# Patient Record
Sex: MALE | Race: WHITE | Employment: OTHER | URBAN - NONMETROPOLITAN AREA
[De-identification: names, ages, dates, MRNs, and addresses within clinical notes are randomized per-mention and may not be internally consistent; named-entity substitution may affect disease eponyms.]

---

## 2017-08-22 RX ORDER — FENOFIBRATE 145 MG/1
145 TABLET, COATED ORAL DAILY
COMMUNITY
End: 2018-12-19

## 2017-08-22 RX ORDER — ATORVASTATIN CALCIUM 40 MG/1
40 TABLET, FILM COATED ORAL DAILY
COMMUNITY
End: 2019-08-13

## 2017-08-22 RX ORDER — FUROSEMIDE 40 MG/1
40 TABLET ORAL 2 TIMES DAILY
COMMUNITY
End: 2017-10-03 | Stop reason: ALTCHOICE

## 2017-08-22 RX ORDER — CARVEDILOL 25 MG/1
25 TABLET ORAL 2 TIMES DAILY WITH MEALS
COMMUNITY
End: 2019-03-25 | Stop reason: SDUPTHER

## 2017-08-22 RX ORDER — LISINOPRIL 5 MG/1
5 TABLET ORAL DAILY
COMMUNITY
End: 2019-07-22 | Stop reason: SDUPTHER

## 2017-08-22 RX ORDER — SPIRONOLACTONE 25 MG/1
25 TABLET ORAL DAILY
COMMUNITY
End: 2019-08-13

## 2017-10-03 ENCOUNTER — TELEPHONE (OUTPATIENT)
Dept: CARDIOLOGY | Age: 67
End: 2017-10-03

## 2017-10-03 ENCOUNTER — OFFICE VISIT (OUTPATIENT)
Dept: CARDIOLOGY | Age: 67
End: 2017-10-03
Payer: MEDICARE

## 2017-10-03 VITALS — BODY MASS INDEX: 31.23 KG/M2 | HEIGHT: 67 IN | WEIGHT: 199 LBS

## 2017-10-03 DIAGNOSIS — I25.10 CORONARY ARTERY DISEASE INVOLVING NATIVE CORONARY ARTERY OF NATIVE HEART WITHOUT ANGINA PECTORIS: ICD-10-CM

## 2017-10-03 DIAGNOSIS — I50.22 CHRONIC SYSTOLIC CONGESTIVE HEART FAILURE (HCC): Primary | ICD-10-CM

## 2017-10-03 DIAGNOSIS — Z95.810 AICD (AUTOMATIC CARDIOVERTER/DEFIBRILLATOR) PRESENT: ICD-10-CM

## 2017-10-03 DIAGNOSIS — I42.8 NONISCHEMIC CARDIOMYOPATHY (HCC): ICD-10-CM

## 2017-10-03 DIAGNOSIS — Z79.4 TYPE 2 DIABETES MELLITUS WITH COMPLICATION, WITH LONG-TERM CURRENT USE OF INSULIN (HCC): ICD-10-CM

## 2017-10-03 DIAGNOSIS — E78.2 MIXED HYPERLIPIDEMIA: ICD-10-CM

## 2017-10-03 DIAGNOSIS — E11.8 TYPE 2 DIABETES MELLITUS WITH COMPLICATION, WITH LONG-TERM CURRENT USE OF INSULIN (HCC): ICD-10-CM

## 2017-10-03 PROCEDURE — 4040F PNEUMOC VAC/ADMIN/RCVD: CPT | Performed by: CLINICAL NURSE SPECIALIST

## 2017-10-03 PROCEDURE — 3017F COLORECTAL CA SCREEN DOC REV: CPT | Performed by: CLINICAL NURSE SPECIALIST

## 2017-10-03 PROCEDURE — 4004F PT TOBACCO SCREEN RCVD TLK: CPT | Performed by: CLINICAL NURSE SPECIALIST

## 2017-10-03 PROCEDURE — G8427 DOCREV CUR MEDS BY ELIG CLIN: HCPCS | Performed by: CLINICAL NURSE SPECIALIST

## 2017-10-03 PROCEDURE — G8484 FLU IMMUNIZE NO ADMIN: HCPCS | Performed by: CLINICAL NURSE SPECIALIST

## 2017-10-03 PROCEDURE — 99204 OFFICE O/P NEW MOD 45 MIN: CPT | Performed by: CLINICAL NURSE SPECIALIST

## 2017-10-03 PROCEDURE — 1123F ACP DISCUSS/DSCN MKR DOCD: CPT | Performed by: CLINICAL NURSE SPECIALIST

## 2017-10-03 PROCEDURE — 3046F HEMOGLOBIN A1C LEVEL >9.0%: CPT | Performed by: CLINICAL NURSE SPECIALIST

## 2017-10-03 PROCEDURE — G8598 ASA/ANTIPLAT THER USED: HCPCS | Performed by: CLINICAL NURSE SPECIALIST

## 2017-10-03 PROCEDURE — G8417 CALC BMI ABV UP PARAM F/U: HCPCS | Performed by: CLINICAL NURSE SPECIALIST

## 2017-10-03 RX ORDER — ASPIRIN 325 MG
TABLET ORAL
COMMUNITY
End: 2022-03-28

## 2017-10-03 NOTE — PROGRESS NOTES
[facility], [address], [address]  [address]  Phone: [phone]  Fax: [phone]    OFFICE VISIT:  10/3/2017    [facility] [address]: 1950    Reason For Visit:  Ruben Fernandez is a 74 y.o. male who is here for to establish care with a history of systolic congestive heart failure and an AICD    HPI   Patient is referred here by his PCP who was concerned that he had had no follow-up for his systolic congestive heart failure and his subcutaneous ICD. Patient had his heart cath initially here at [facility] in 2014 and then was transferred to [facility] for care. He saw Dr. aPm Maldonado there. He states his physician at [facility] left and he was supposed to be rescheduled with another provider, but he states he never received a phone call and just never followed up. His device has not been checked he states since 2015 and his PCP was concerned. Patient had a nonischemic cardiomyopathy. His wife reports they were told his ejection fraction had improved and was initially 10-15% and 2014. She is denying any signs of fluid overload such as sudden weight gain, worsening dyspnea, orthopnea, PND, edema. He denies palpitations or chest pain. He reports he is quite active at home and is able to walk a mile before he feels short of breath. He does a lot of hunting and fishing and walking across fields and tolerates this well. He reports he has continued his medications regularly    No primary care provider on file. is PCP.   Ilda Fang has the following history as recorded in [facility]:    Patient Active Problem List    Diagnosis Date Noted    Coronary artery disease involving native coronary artery of native heart without angina pectoris 10/04/2017    Nonischemic cardiomyopathy ([address] 10/04/2017    Mixed hyperlipidemia 10/04/2017    Type 2 diabetes mellitus with complication, with long-term current use of insulin ([address] 10/04/2017    AICD (automatic cardioverter/defibrillator) present 10/04/2017     Past Medical History:   Diagnosis Date    AICD (automatic cardioverter/defibrillator) present 10/4/2017    Cardiac defibrillator in place     Coronary artery disease involving native coronary artery of native heart without angina pectoris 10/4/2017    Hypertension     Mixed hyperlipidemia 10/4/2017    [facility]     Nonischemic cardiomyopathy ([facility] 10/4/2017    Type 2 diabetes mellitus with complication, with long-term current use of insulin ([facility] 10/4/2017     Past Surgical History:   Procedure Laterality Date    CARDIAC CATHETERIZATION  6/9/14  JDT    diffuse CAD. EF 5%    CARDIAC DEFIBRILLATOR PLACEMENT  2014    Subcutaneous AICD, [place]    NECK SURGERY      SHOULDER SURGERY       History reviewed. No pertinent family history. Social History   Substance Use Topics    Smoking status: Not on file    Smokeless tobacco: Not on file    Alcohol use Not on file      Current Outpatient Prescriptions   Medication Sig Dispense Refill    aspirin 325 MG tablet Take 325 mg by mouth daily 1/2 tablet daily      carvedilol (COREG) 25 MG tablet Take 25 mg by mouth 2 times daily (with meals)      lisinopril (PRINIVIL;ZESTRIL) 5 MG tablet Take 5 mg by mouth daily      atorvastatin (LIPITOR) 40 MG tablet Take 40 mg by mouth daily      spironolactone (ALDACTONE) 25 MG tablet Take 25 mg by mouth daily      Dulaglutide (TRULICITY) 9.22 FH/9.8UV SOPN Inject into the skin      fenofibrate (TRICOR) 145 MG tablet Take 145 mg by mouth daily       No current facility-administered medications for this visit. Allergies: Nsaids    Review of Systems  Review of Systems   Constitutional: Positive for malaise/fatigue (mild). Negative for chills and fever. Eyes: Negative for blurred vision. Respiratory: Positive for shortness of breath (can walk a mile before feeling short of breath). Negative for cough. Cardiovascular: Positive for leg swelling (mild).  Negative for chest pain, palpitations, orthopnea and PND. Gastrointestinal: Negative for heartburn, nausea and vomiting. Musculoskeletal: Negative for falls and myalgias. Skin: Negative for rash. Neurological: Negative for dizziness, sensory change, speech change, focal weakness and headaches. Endo/Heme/Allergies: Does not bruise/bleed easily. Psychiatric/Behavioral: Negative for depression. The patient is not nervous/anxious. Objective  Vital Signs - Ht 5' 7\" (1.702 m)  Wt 199 lb (90.3 kg)  BMI 31.17 kg/m2  Physical Exam   Constitutional: He is oriented to person, place, and time. He appears well-developed and well-nourished. No distress. HENT:   Head: Normocephalic and atraumatic. Eyes: Pupils are equal, round, and reactive to light. Right eye exhibits no discharge. Left eye exhibits no discharge. Neck: No JVD present. No tracheal deviation present. Cardiovascular: Normal rate, regular rhythm, normal heart sounds and intact distal pulses. Exam reveals no gallop and no friction rub. No murmur heard. No carotid bruit   Pulmonary/Chest: Effort normal and breath sounds normal. No respiratory distress. He has no wheezes. He has no rales. Abdominal: Soft. There is no tenderness. Musculoskeletal: He exhibits edema (trace lower extremity edema). Normal gait and station   Neurological: He is alert and oriented to person, place, and time. No cranial nerve deficit. Skin: Skin is warm and dry. No rash noted. Psychiatric: He has a normal mood and affect. His behavior is normal. Judgment normal.   Nursing note and vitals reviewed. Assessment:    1. Chronic systolic congestive heart failure (HCC)  ECHO Complete 2D W Doppler W Color   2. Nonischemic cardiomyopathy ([address]     3. Coronary artery disease involving native coronary artery of native heart without angina pectoris     4. Mixed hyperlipidemia     5. Type 2 diabetes mellitus with complication, with long-term current use of insulin ([address]     6. AICD (automatic cardioverter/defibrillator) present       Patient is taking medications as prescribed    Heart cath from 2014 which showed an ejection fraction of 5%, moderate, diffuse CAD    We are unable to interrogate his device as a special machine is required for his subcutaneous AICD. It is a iDevices device. We will get the machine here for his next visit and interrogate at that time. Patient is denying any ischemic symptoms. Plan will be to get a baseline 2-D echocardiogram to assess his ejection fraction as it is been several years since this has been done. We also need records from [facility] and will have patient sign a release to obtain. He appears euvolemic today and is able to be quite active at home without symptoms. He is on guideline directed medical therapy for heart failure    NYHA Class II, Stage C    Stable cardiovascular status. No evidence of overt heart failure, angina or dysrhythmia. Plan    Orders Placed This Encounter   Procedures    ECHO Complete 2D W Doppler W Color     Standing Status:   Future     Standing Expiration Date:   10/3/2018     Order Specific Question:   Reason for exam:     Answer:   systolic chf     Followup With Dr. Bal Maldonado 4 weeks  Obtain Records- Dr. Duenas Ranks at [address] daily and report weight gain of 3lbs or more in 24hrs or 5lbs in one week. Call for increasing shortness of breath or increasing swelling in feet and legs. (This could mean you are retaining too much fluid)  - 2000mg low sodium diet  - Fluid restriction of 1500ml per day (about 6 cups of fluid per day)    Call with any questions or concerns  Follow up with No primary care provider on file. for non cardiac problems  Report any new problems  Cardiovascular Fitness-Exercise as tolerated. Strive for 15 minutes of exercise most days of the week.     Cardiac / Healthy Diet  Continue current medications as directed  Continue plan of treatment  It is always recommended that you bring your medications bottles with you to each visit - this is for your safety!        SMILEY Fernandez

## 2017-10-03 NOTE — MR AVS SNAPSHOT
After Visit Summary             Quyen Kumar   10/3/2017 2:00 PM   Office Visit    Description:  Male : 1950   Provider:  SMILEY De Jesus   Department:  [facility]              Your Follow-Up and Future Appointments         Below is a list of your follow-up and future appointments. This may not be a complete list as you may have made appointments directly with providers that we are not aware of or your providers may have made some for you. Please call your providers to confirm appointments. It is important to keep your appointments. Please bring your current insurance card, photo ID, co-pay, and all medication bottles to your appointment. If self-pay, payment is expected at the time of service. Your To-Do List     Future Appointments Provider Department Dept Phone    10/31/2017 2:45 PM Roderick Rucker MD [id number] [phone]    Please arrive 15 minutes prior to appointment time, bring insurance card and photo ID. Future Orders Complete By Expires    ECHO Complete 2D W Doppler W Color [34993 Custom]  10/10/2017 10/3/2018         Information from Your Visit        Department     Name Address Phone Fax    [facility] [address]      You Were Seen for:         Comments    Chronic systolic congestive heart failure (HCC)   [151099]         Vital Signs     Height Weight Body Mass Index             5' 7\" (1.702 m) 199 lb (90.3 kg) 31.17 kg/m2         Additional Information about your Body Mass Index (BMI)           Your BMI as listed above is considered obese (30 or more). BMI is an estimate of body fat, calculated from your height and weight.   The higher your BMI, the greater your risk of heart disease, high blood pressure, type 2 diabetes, stroke, gallstones, arthritis, sleep apnea, and certain lower your chance of needing to be hospitalized again. ¨ Angiotensin II receptor blockers (ARBs) work like ACEIs. Your doctor may prescribe them instead of ACEIs. ¨ Diuretics, also called water pills, reduce swelling. ¨ Potassium supplements replace this important mineral, which is sometimes lost with diuretics. ¨ Aspirin and other blood thinners prevent blood clots, which can cause a stroke or heart attack. You will get more details on the specific medicines your doctor prescribes. Diet  · Your doctor may suggest that you limit sodium to 2,000 milligrams (mg) a day or less. That is less than 1 teaspoon of salt a day, including all the salt you eat in cooking or in packaged foods. People get most of their sodium from processed foods. Fast food and restaurant meals also tend to be very high in sodium. · Ask your doctor how much liquid you can drink each day. You may have to limit liquids. Weight  · Weigh yourself without clothing at the same time each day. Record your weight. Call your doctor if you have a sudden weight gain, such as more than 2 to 3 pounds in a day or 5 pounds in a week. (Your doctor may suggest a different range of weight gain.) A sudden weight gain may mean that your heart failure is getting worse. Activity level  · Start light exercise (if your doctor says it is okay). Even if you can only do a small amount, exercise will help you get stronger, have more energy, and manage your weight and your stress. Walking is an easy way to get exercise. Start out by walking a little more than you did before. Bit by bit, increase the amount you walk. · When you exercise, watch for signs that your heart is working too hard. You are pushing yourself too hard if you cannot talk while you are exercising.  If you become short of breath or dizzy or have chest pain, stop, sit down, and rest.  · If you feel \"wiped out\" the day after you exercise, walk slower or for a [employer] allows you to send messages to your doctor, view your test results, renew your prescriptions, schedule appointments, view visit notes, and more. How Do I Sign Up? 1. In your Internet browser, go to [email or web address]  2. Click on the Sign Up Now link in the Sign In box. You will see the New Member Sign Up page. 3. Enter your [employer] Access Code exactly as it appears below. You will not need to use this code after youve completed the sign-up process. If you do not sign up before the expiration date, you must request a new code. [employer] Access Code: [email or web address]  Expires: 12/2/2017  3:42 PM    4. Enter your Social Security Number ([id number]) and Date of Birth (mm/dd/yyyy) as indicated and click Submit. You will be taken to the next sign-up page. 5. Create a [employer] ID. This will be your [employer] login ID and cannot be changed, so think of one that is secure and easy to remember. 6. Create a [employer] password. You can change your password at any time. 7. Enter your Password Reset Question and Answer. This can be used at a later time if you forget your password. 8. Enter your e-mail address. You will receive e-mail notification when new information is available in [address] 9. Click Sign Up. You can now view your medical record. Additional Information  If you have questions, please contact the physician practice where you receive care. Remember, [employer] is NOT to be used for urgent needs. For medical emergencies, dial 911. For questions regarding your [employer] account call [phone]. If you have a clinical question, please call your doctor's office.

## 2017-10-03 NOTE — PATIENT INSTRUCTIONS
Followup With Dr. Lennox Doffing 4 weeks  Dr. Ean Garcia at [facility] daily and report weight gain of 3lbs or more in 24hrs or 5lbs in one week. Call for increasing shortness of breath or increasing swelling in feet and legs. (This could mean you are retaining too much fluid)  - 2000mg low sodium diet  - Fluid restriction of 1500ml per day (about 6 cups of fluid per day)    Call with any questions or concerns  Follow up with No primary care provider on file. for non cardiac problems  Report any new problems  Cardiovascular Fitness-Exercise as tolerated. Strive for 15 minutes of exercise most days of the week. Cardiac / Healthy Diet  Continue current medications as directed  Continue plan of treatment  It is always recommended that you bring your medications bottles with you to each visit - this is for your safety! You are scheduled for your echo at [facility] @ 12:30 pm please arrive about 15 minutes early to register. Transthoracic Echocardiogram -  No prep. Takes approximately 30 min. An echocardiogram uses sound waves to produce images of your heart. This commonly used test allows your doctor to see how your heart is beating and pumping blood. Your doctor can use the images from an echocardiogram to identify various abnormalities in the heart muscle and valves. This test has 2 parts:   Ø You will be asked to disrobe from the waist up and given a gown to wear. The technologist will then hook up an EKG monitor to you for the entire exam.   Ø You will then have an ultrasound of your heart (echocardiogram) to assess the heart muscle, heart valves and heart function. You may eat and take any medicines before the exam.     If you need to change your appointment, please call outpatient scheduling at [phone]. Heart Failure: Care Instructions  Your Care Instructions    Heart failure occurs when your heart does not pump as much blood as the body needs.  Failure does not medicines your doctor prescribes. Diet  · Your doctor may suggest that you limit sodium to 2,000 milligrams (mg) a day or less. That is less than 1 teaspoon of salt a day, including all the salt you eat in cooking or in packaged foods. People get most of their sodium from processed foods. Fast food and restaurant meals also tend to be very high in sodium. · Ask your doctor how much liquid you can drink each day. You may have to limit liquids. Weight  · Weigh yourself without clothing at the same time each day. Record your weight. Call your doctor if you have a sudden weight gain, such as more than 2 to 3 pounds in a day or 5 pounds in a week. (Your doctor may suggest a different range of weight gain.) A sudden weight gain may mean that your heart failure is getting worse. Activity level  · Start light exercise (if your doctor says it is okay). Even if you can only do a small amount, exercise will help you get stronger, have more energy, and manage your weight and your stress. Walking is an easy way to get exercise. Start out by walking a little more than you did before. Bit by bit, increase the amount you walk. · When you exercise, watch for signs that your heart is working too hard. You are pushing yourself too hard if you cannot talk while you are exercising. If you become short of breath or dizzy or have chest pain, stop, sit down, and rest.  · If you feel \"wiped out\" the day after you exercise, walk slower or for a shorter distance until you can work up to a better pace. · Get enough rest at night. Sleeping with 1 or 2 pillows under your upper body and head may help you breathe easier. Lifestyle changes  · Do not smoke. Smoking can make a heart condition worse. If you need help quitting, talk to your doctor about stop-smoking programs and medicines. These can increase your chances of quitting for good. Quitting smoking may be the most important step you can take to protect your heart.   · Limit alcohol to

## 2017-10-04 PROBLEM — E11.8 TYPE 2 DIABETES MELLITUS WITH COMPLICATION, WITH LONG-TERM CURRENT USE OF INSULIN (HCC): Status: ACTIVE | Noted: 2017-10-04

## 2017-10-04 PROBLEM — I25.10 CORONARY ARTERY DISEASE INVOLVING NATIVE CORONARY ARTERY OF NATIVE HEART WITHOUT ANGINA PECTORIS: Status: ACTIVE | Noted: 2017-10-04

## 2017-10-04 PROBLEM — Z79.4 TYPE 2 DIABETES MELLITUS WITH COMPLICATION, WITH LONG-TERM CURRENT USE OF INSULIN (HCC): Status: ACTIVE | Noted: 2017-10-04

## 2017-10-04 PROBLEM — I42.8 NONISCHEMIC CARDIOMYOPATHY (HCC): Status: ACTIVE | Noted: 2017-10-04

## 2017-10-04 PROBLEM — Z95.810 AICD (AUTOMATIC CARDIOVERTER/DEFIBRILLATOR) PRESENT: Status: ACTIVE | Noted: 2017-10-04

## 2017-10-04 PROBLEM — E78.2 MIXED HYPERLIPIDEMIA: Status: ACTIVE | Noted: 2017-10-04

## 2017-10-04 ASSESSMENT — ENCOUNTER SYMPTOMS
NAUSEA: 0
BLURRED VISION: 0
COUGH: 0
VOMITING: 0
HEARTBURN: 0
ORTHOPNEA: 0
SHORTNESS OF BREATH: 1

## 2017-10-30 ENCOUNTER — TELEPHONE (OUTPATIENT)
Dept: CARDIOLOGY | Age: 67
End: 2017-10-30

## 2017-10-31 ENCOUNTER — OFFICE VISIT (OUTPATIENT)
Dept: CARDIOLOGY | Age: 67
End: 2017-10-31
Payer: MEDICARE

## 2017-10-31 VITALS
BODY MASS INDEX: 29.82 KG/M2 | HEART RATE: 62 BPM | HEIGHT: 67 IN | DIASTOLIC BLOOD PRESSURE: 68 MMHG | WEIGHT: 190 LBS | SYSTOLIC BLOOD PRESSURE: 128 MMHG

## 2017-10-31 DIAGNOSIS — Z95.810 AICD (AUTOMATIC CARDIOVERTER/DEFIBRILLATOR) PRESENT: ICD-10-CM

## 2017-10-31 DIAGNOSIS — I25.10 CORONARY ARTERY DISEASE INVOLVING NATIVE CORONARY ARTERY OF NATIVE HEART WITHOUT ANGINA PECTORIS: ICD-10-CM

## 2017-10-31 DIAGNOSIS — E78.2 MIXED HYPERLIPIDEMIA: ICD-10-CM

## 2017-10-31 DIAGNOSIS — I42.8 NONISCHEMIC CARDIOMYOPATHY (HCC): ICD-10-CM

## 2017-10-31 DIAGNOSIS — I50.22 CHRONIC SYSTOLIC CONGESTIVE HEART FAILURE (HCC): Primary | ICD-10-CM

## 2017-10-31 PROCEDURE — 1123F ACP DISCUSS/DSCN MKR DOCD: CPT | Performed by: INTERNAL MEDICINE

## 2017-10-31 PROCEDURE — G8417 CALC BMI ABV UP PARAM F/U: HCPCS | Performed by: INTERNAL MEDICINE

## 2017-10-31 PROCEDURE — G8598 ASA/ANTIPLAT THER USED: HCPCS | Performed by: INTERNAL MEDICINE

## 2017-10-31 PROCEDURE — 4040F PNEUMOC VAC/ADMIN/RCVD: CPT | Performed by: INTERNAL MEDICINE

## 2017-10-31 PROCEDURE — G8427 DOCREV CUR MEDS BY ELIG CLIN: HCPCS | Performed by: INTERNAL MEDICINE

## 2017-10-31 PROCEDURE — 3017F COLORECTAL CA SCREEN DOC REV: CPT | Performed by: INTERNAL MEDICINE

## 2017-10-31 PROCEDURE — 93261 INTERROGATE SUBQ DEFIB: CPT | Performed by: INTERNAL MEDICINE

## 2017-10-31 PROCEDURE — G8484 FLU IMMUNIZE NO ADMIN: HCPCS | Performed by: INTERNAL MEDICINE

## 2017-10-31 PROCEDURE — 4004F PT TOBACCO SCREEN RCVD TLK: CPT | Performed by: INTERNAL MEDICINE

## 2017-10-31 PROCEDURE — 99213 OFFICE O/P EST LOW 20 MIN: CPT | Performed by: INTERNAL MEDICINE

## 2017-10-31 NOTE — PROGRESS NOTES
[employer] rep here with  for ICD. SubQ ICD interrogated. No new episodes. Update on Subq ICD completed. Battery at 55%.

## 2017-10-31 NOTE — PROGRESS NOTES
[facility] Patient Office Visit    [place] [address]  Phone: [phone]  Fax: [phone]        10/31/2017    Chief Complaint / Reason for the Visit   Follow up of:  CAD  and Cardiomyopathy and CHF      Specialty Problems        Cardiology Problems    Coronary artery disease involving native coronary artery of native heart without angina pectoris        Nonischemic cardiomyopathy [facility])              Current Status Today According to the patient:  \"I'm doing okay\"    Subjective:  Mr. Rosangela Brian is generally feeling stable. Mr. Rosangela Brian has the following cardiac complaints / symptoms today:    1. CAD, Is stable on current medications    2. Cardiomyopathy, Is stable on current medications    3.  CHF, Is stable on current medications        Rosangela Brian is a 74 y.o. male with the following history as recorded in [employer]:    Patient Active Problem List    Diagnosis Date Noted    Coronary artery disease involving native coronary artery of native heart without angina pectoris 10/04/2017    Nonischemic [facility] ([facility] 10/04/2017    Mixed hyperlipidemia 10/04/2017    Type 2 diabetes mellitus with complication, with long-term current use of insulin ([facility] 10/04/2017    AICD (automatic cardioverter/defibrillator) present 10/04/2017     Current Outpatient Prescriptions   Medication Sig Dispense Refill    aspirin 325 MG tablet Take 325 mg by mouth daily 1/2 tablet daily      carvedilol (COREG) 25 MG tablet Take 25 mg by mouth 2 times daily (with meals)      lisinopril (PRINIVIL;ZESTRIL) 5 MG tablet Take 5 mg by mouth daily      atorvastatin (LIPITOR) 40 MG tablet Take 40 mg by mouth daily      spironolactone (ALDACTONE) 25 MG tablet Take 25 mg by mouth daily      Dulaglutide (TRULICITY) 5.64 TS/8.4JH SOPN Inject into the skin      fenofibrate (TRICOR) 145 MG tablet Take 145 mg by mouth daily       No current facility-administered medications for this visit. Allergies: Nsaids  Past Medical History:   Diagnosis Date    AICD (automatic cardioverter/defibrillator) present 10/4/2017    Cardiac defibrillator in place     Coronary artery disease involving native coronary artery of native heart without angina pectoris 10/4/2017    Hypertension     Mixed hyperlipidemia 10/4/2017    Myocardial infarction     Nonischemic cardiomyopathy ([facility] 10/4/2017    Type 2 diabetes mellitus with complication, with long-term current use of insulin ([facility] 10/4/2017     Past Surgical History:   Procedure Laterality Date    CARDIAC CATHETERIZATION  6/9/14  JDT    diffuse CAD. EF 5%    CARDIAC DEFIBRILLATOR PLACEMENT  2014    Subcutaneous AICD, [place]    NECK SURGERY      SHOULDER SURGERY       History reviewed. No pertinent family history. Social History   Substance Use Topics    Smoking status: Not on file    Smokeless tobacco: Not on file    Alcohol use Not on file          Review of Systems:    General:      Complaint / Symptom Yes / No / Description if Yes       Fatigue No   Weight gain No   Insomnia No       Respiratory:        Complaint / Symptom Yes / No / Description if Yes       Cough No   Horseness No       Cardiovascular:    Complaint / Symptom Yes / No / Description if Yes       Chest Pain No   Shortness of Air / Orthopnea No   Presyncope / Syncope No   Palpitations No         Objective:    /68   Pulse 62   Ht 5' 7\" (1.702 m)   Wt 190 lb (86.2 kg)   BMI 29.76 kg/m²     GENERAL - well developed and well nourished, conversant  HEENT -  PERRLA, Hearing appears normal  NECK - no thyromegaly, no JVD, trachea is in the midline  CARDIOVASCULAR - PMI is in the mid line clavicular position, Normal S1 and S2 with a grade 1/6 systolic murmur. No S3 or S4    PULMONARY - no respiratory distress. No wheezes or rales.  Lungs are clear to ausculation   ABDOMEN  - soft, non tender, no rebound  MUSCULOSKELETAL  -

## 2018-02-23 ENCOUNTER — TELEPHONE (OUTPATIENT)
Dept: CARDIOLOGY | Age: 68
End: 2018-02-23

## 2018-02-27 ENCOUNTER — OFFICE VISIT (OUTPATIENT)
Dept: CARDIOLOGY | Age: 68
End: 2018-02-27
Payer: MEDICARE

## 2018-02-27 VITALS
BODY MASS INDEX: 30.61 KG/M2 | DIASTOLIC BLOOD PRESSURE: 58 MMHG | SYSTOLIC BLOOD PRESSURE: 120 MMHG | HEART RATE: 62 BPM | HEIGHT: 67 IN | WEIGHT: 195 LBS

## 2018-02-27 DIAGNOSIS — E78.2 MIXED HYPERLIPIDEMIA: ICD-10-CM

## 2018-02-27 DIAGNOSIS — Z95.810 AICD (AUTOMATIC CARDIOVERTER/DEFIBRILLATOR) PRESENT: ICD-10-CM

## 2018-02-27 DIAGNOSIS — I50.22 CHRONIC SYSTOLIC CONGESTIVE HEART FAILURE (HCC): Primary | ICD-10-CM

## 2018-02-27 DIAGNOSIS — I42.8 NONISCHEMIC CARDIOMYOPATHY (HCC): ICD-10-CM

## 2018-02-27 DIAGNOSIS — I25.10 CORONARY ARTERY DISEASE INVOLVING NATIVE CORONARY ARTERY OF NATIVE HEART WITHOUT ANGINA PECTORIS: ICD-10-CM

## 2018-02-27 PROCEDURE — 93261 INTERROGATE SUBQ DEFIB: CPT | Performed by: INTERNAL MEDICINE

## 2018-02-27 PROCEDURE — 3017F COLORECTAL CA SCREEN DOC REV: CPT | Performed by: INTERNAL MEDICINE

## 2018-02-27 PROCEDURE — 4040F PNEUMOC VAC/ADMIN/RCVD: CPT | Performed by: INTERNAL MEDICINE

## 2018-02-27 PROCEDURE — 99212 OFFICE O/P EST SF 10 MIN: CPT | Performed by: INTERNAL MEDICINE

## 2018-02-27 PROCEDURE — G8417 CALC BMI ABV UP PARAM F/U: HCPCS | Performed by: INTERNAL MEDICINE

## 2018-02-27 PROCEDURE — 4004F PT TOBACCO SCREEN RCVD TLK: CPT | Performed by: INTERNAL MEDICINE

## 2018-02-27 PROCEDURE — G8484 FLU IMMUNIZE NO ADMIN: HCPCS | Performed by: INTERNAL MEDICINE

## 2018-02-27 PROCEDURE — G8427 DOCREV CUR MEDS BY ELIG CLIN: HCPCS | Performed by: INTERNAL MEDICINE

## 2018-02-27 PROCEDURE — G8598 ASA/ANTIPLAT THER USED: HCPCS | Performed by: INTERNAL MEDICINE

## 2018-02-27 PROCEDURE — 1123F ACP DISCUSS/DSCN MKR DOCD: CPT | Performed by: INTERNAL MEDICINE

## 2018-05-08 ENCOUNTER — TELEPHONE (OUTPATIENT)
Dept: CARDIOLOGY | Age: 68
End: 2018-05-08

## 2018-06-01 ENCOUNTER — OFFICE VISIT (OUTPATIENT)
Dept: CARDIOLOGY | Age: 68
End: 2018-06-01
Payer: MEDICARE

## 2018-06-01 VITALS
DIASTOLIC BLOOD PRESSURE: 74 MMHG | WEIGHT: 194 LBS | HEART RATE: 70 BPM | HEIGHT: 67 IN | SYSTOLIC BLOOD PRESSURE: 126 MMHG | BODY MASS INDEX: 30.45 KG/M2

## 2018-06-01 DIAGNOSIS — I42.8 NONISCHEMIC CARDIOMYOPATHY (HCC): ICD-10-CM

## 2018-06-01 DIAGNOSIS — Z95.810 AICD (AUTOMATIC CARDIOVERTER/DEFIBRILLATOR) PRESENT: ICD-10-CM

## 2018-06-01 DIAGNOSIS — I50.22 CHRONIC SYSTOLIC CONGESTIVE HEART FAILURE (HCC): Primary | ICD-10-CM

## 2018-06-01 PROCEDURE — 99213 OFFICE O/P EST LOW 20 MIN: CPT | Performed by: CLINICAL NURSE SPECIALIST

## 2018-06-01 PROCEDURE — G8417 CALC BMI ABV UP PARAM F/U: HCPCS | Performed by: CLINICAL NURSE SPECIALIST

## 2018-06-01 PROCEDURE — 4040F PNEUMOC VAC/ADMIN/RCVD: CPT | Performed by: CLINICAL NURSE SPECIALIST

## 2018-06-01 PROCEDURE — G8598 ASA/ANTIPLAT THER USED: HCPCS | Performed by: CLINICAL NURSE SPECIALIST

## 2018-06-01 PROCEDURE — 1123F ACP DISCUSS/DSCN MKR DOCD: CPT | Performed by: CLINICAL NURSE SPECIALIST

## 2018-06-01 PROCEDURE — 1036F TOBACCO NON-USER: CPT | Performed by: CLINICAL NURSE SPECIALIST

## 2018-06-01 PROCEDURE — 93282 PRGRMG EVAL IMPLANTABLE DFB: CPT | Performed by: CLINICAL NURSE SPECIALIST

## 2018-06-01 PROCEDURE — 3017F COLORECTAL CA SCREEN DOC REV: CPT | Performed by: CLINICAL NURSE SPECIALIST

## 2018-06-01 PROCEDURE — G8427 DOCREV CUR MEDS BY ELIG CLIN: HCPCS | Performed by: CLINICAL NURSE SPECIALIST

## 2018-06-01 ASSESSMENT — ENCOUNTER SYMPTOMS
SHORTNESS OF BREATH: 1
COUGH: 0
BLURRED VISION: 0
HEARTBURN: 0
VOMITING: 0
NAUSEA: 0
ORTHOPNEA: 0

## 2018-07-03 ENCOUNTER — TELEPHONE (OUTPATIENT)
Dept: CARDIOLOGY | Age: 68
End: 2018-07-03

## 2018-07-05 ENCOUNTER — TELEPHONE (OUTPATIENT)
Dept: CARDIOLOGY | Age: 68
End: 2018-07-05

## 2018-07-05 NOTE — TELEPHONE ENCOUNTER
Called to rs  apt with JDT 09/04/18, left msg to ret call & rs apt with APRN at either location, called both #'s.

## 2018-07-06 ENCOUNTER — TELEPHONE (OUTPATIENT)
Dept: CARDIOLOGY | Age: 68
End: 2018-07-06

## 2018-07-06 NOTE — TELEPHONE ENCOUNTER
Called to rs  apt with JDT 09/04/18, left msg to ret call & rs apt with APRN at either location. After 3 days attempting to contact pt with no response, rs'd apt and mailed to pt.

## 2018-09-06 ENCOUNTER — OFFICE VISIT (OUTPATIENT)
Dept: CARDIOLOGY | Age: 68
End: 2018-09-06
Payer: MEDICARE

## 2018-09-06 VITALS
BODY MASS INDEX: 29.82 KG/M2 | DIASTOLIC BLOOD PRESSURE: 62 MMHG | HEART RATE: 65 BPM | WEIGHT: 190 LBS | SYSTOLIC BLOOD PRESSURE: 122 MMHG | HEIGHT: 67 IN

## 2018-09-06 DIAGNOSIS — I42.8 NONISCHEMIC CARDIOMYOPATHY (HCC): ICD-10-CM

## 2018-09-06 DIAGNOSIS — I25.10 CORONARY ARTERY DISEASE INVOLVING NATIVE CORONARY ARTERY OF NATIVE HEART WITHOUT ANGINA PECTORIS: Primary | ICD-10-CM

## 2018-09-06 DIAGNOSIS — E78.2 MIXED HYPERLIPIDEMIA: ICD-10-CM

## 2018-09-06 DIAGNOSIS — Z95.810 AICD (AUTOMATIC CARDIOVERTER/DEFIBRILLATOR) PRESENT: ICD-10-CM

## 2018-09-06 DIAGNOSIS — I50.22 CHRONIC SYSTOLIC CONGESTIVE HEART FAILURE (HCC): ICD-10-CM

## 2018-09-06 PROCEDURE — G8427 DOCREV CUR MEDS BY ELIG CLIN: HCPCS | Performed by: NURSE PRACTITIONER

## 2018-09-06 PROCEDURE — 1101F PT FALLS ASSESS-DOCD LE1/YR: CPT | Performed by: NURSE PRACTITIONER

## 2018-09-06 PROCEDURE — 93282 PRGRMG EVAL IMPLANTABLE DFB: CPT | Performed by: NURSE PRACTITIONER

## 2018-09-06 PROCEDURE — 99214 OFFICE O/P EST MOD 30 MIN: CPT | Performed by: NURSE PRACTITIONER

## 2018-09-06 PROCEDURE — 3017F COLORECTAL CA SCREEN DOC REV: CPT | Performed by: NURSE PRACTITIONER

## 2018-09-06 PROCEDURE — G8417 CALC BMI ABV UP PARAM F/U: HCPCS | Performed by: NURSE PRACTITIONER

## 2018-09-06 NOTE — PATIENT INSTRUCTIONS
in your lungs, and you have problems breathing. You might need to go to the hospital. By watching for changes in your condition and avoiding triggers, you can prevent heart failure flare-ups. Follow-up care is a key part of your treatment and safety. Be sure to make and go to all appointments, and call your doctor if you are having problems. It's also a good idea to know your test results and keep a list of the medicines you take. How can you care for yourself at home? Watch for changes in your weight and condition  · Weigh yourself without clothing at the same time each day. Record your weight. Call your doctor if you have sudden weight gain, such as more than 2 to 3 pounds in a day or 5 pounds in a week. (Your doctor may suggest a different range of weight gain.) A sudden weight gain may mean that your heart failure is getting worse. · Keep a daily record of your symptoms. Write down any changes in how you feel, such as new shortness of breath, cough, or problems eating. Also record if your ankles are more swollen than usual and if you feel more tired than usual. Note anything that you ate or did that could have triggered these changes. Limit sodium  Sodium causes your body to hold on to extra water. This may cause your heart failure symptoms to get worse. People get most of their sodium from processed foods. Fast food and restaurant meals also tend to be very high in sodium. · Your doctor may suggest that you limit sodium to 2,000 milligrams (mg) a day or less. That is less than 1 teaspoon of salt a day, including all the salt you eat in cooking or in packaged foods. · Read food labels on cans and food packages. They tell you how much sodium you get in one serving. Check the serving size. If you eat more than one serving, you are getting more sodium. · Be aware that sodium can come in forms other than salt, including monosodium glutamate (MSG), sodium citrate, and sodium bicarbonate (baking soda).  MSG is take them. · Make taking your medicine as simple as you can. Plan times to take your medicines when you are doing other things, such as eating a meal or getting ready for bed. This will make it easier to remember to take your medicines. · Get organized. Use helpful tools, such as daily or weekly pill containers. When should you call for help? Call 911 if you have symptoms of sudden heart failure such as:    · You have severe trouble breathing.     · You cough up pink, foamy mucus.     · You have a new irregular or rapid heartbeat.    Call your doctor now or seek immediate medical care if:    · You have new or increased shortness of breath.     · You are dizzy or lightheaded, or you feel like you may faint.     · You have sudden weight gain, such as more than 2 to 3 pounds in a day or 5 pounds in a week. (Your doctor may suggest a different range of weight gain.)     · You have increased swelling in your legs, ankles, or feet.     · You are suddenly so tired or weak that you cannot do your usual activities.    Watch closely for changes in your health, and be sure to contact your doctor if you develop new symptoms. Where can you learn more? Go to https://[employer] Mod[employer]weetTV.AppBrick. org and sign in to your Spark account. Enter X919 in the [employer] box to learn more about \"Avoiding Triggers With Heart Failure: Care Instructions. \"     If you do not have an account, please click on the \"Sign Up Now\" link. Current as of: December 6, 2017  Content Version: 11.7  © 1611-1191 [employer]. Care instructions adapted under license by [facility] [place] ([facility]). If you have questions about a medical condition or this instruction, always ask your healthcare professional. [place] [address] any warranty or liability for your use of this information.

## 2018-09-06 NOTE — PROGRESS NOTES
[facility], [place]. [address], [address], [address]  [phone] office  [phone] fax      OFFICE VISIT:  9/6/2018    [address]: 1950    Reason For Visit:  Radha Arias is a 74 y.o. male who is here for 3 Month Follow-Up (Patient presents for cardiology follow up and AICD check doing well.); Coronary Artery Disease; Cardiomyopathy (s/p AICD); and Hyperlipidemia    The patient presents today for cardiology follow up and interrogation of [employer] AICD. Overall, the patient is doing well from a cardiac standpoint without symptoms to suggest myocardial ischemia or decompensated heart failure. He weighs most days of the week. Weight is down 4 pounds from last visit. The patient denies sensation of fast heart rates and has not sustained AICD disharges. BP is well controlled on current regimen. The patient's PCP monitors cholesterol. Subjective  Edwin denies exertional chest pain, shortness of breath, orthopnea, paroxysmal nocturnal dyspnea, syncope, presyncope, sustained arrythmia, edema and fatigue. The patient denies numbness or weakness to suggest cerebrovascular accident or transient ischemic attack.       Bk Aguilar has the following history as [facility] in [facility]:    Patient Active Problem List   Diagnosis Code    Coronary artery disease involving native coronary artery of native heart without angina pectoris I25.10    Nonischemic cardiomyopathy ([address] I42.8    Mixed hyperlipidemia E78.2    Type 2 diabetes mellitus with complication, with long-term current use of insulin ([address] E11.8, Z79.4    AICD (automatic cardioverter/defibrillator) present Z95.810    Chronic systolic congestive heart failure ([address] I50.22     Past Medical History:   Diagnosis Date    AICD (automatic cardioverter/defibrillator) present 10/4/2017    Cardiac defibrillator in place     Chronic systolic congestive heart failure as scheduled with your cardiologist - 3 months AICD check and Dr. Myla Tsang. The following educational material has been included in this after visit summary for your review: avoiding heart failure triggers.     Additional instructions:  *Weigh yourself without clothing at the same time each day. Record your weight. Call the office if you gain 3 pounds or more in 2 to 3 days or 5 pounds in one week. A sudden weight gain may mean that your heart failure is getting worse. *Sodium causes your body to hold on to extra water. This may cause your heart failure symptoms to get worse. Limit sodium to 2,000 milligrams (mg) a day or less. That is less than 1 teaspoon of salt a day, including all the salt you eat in cooking or in packaged foods. Coronary artery disease risk factors you can control: Smoking, high blood pressure, high cholesterol, diabetes, being overweight, lack of exercise and stress. Continue heart healthy diet. Take medications as directed. Exercise as tolerated. Strive for 15 minutes of exercise most days of the week. If asked to keep a blood pressure log, do so for 2 weeks. Call the office to report readings at [phone]. Blood pressure goal is 140/90 or less. If you are a diabetic, the goal is 130/80 or less. If you are taking cholesterol lowering medications, it is recommended that lab work be checked annually.   Always keep a current medication list. Bring your medications to every office visit.       SMILEY Ayala

## 2018-12-19 ENCOUNTER — OFFICE VISIT (OUTPATIENT)
Dept: CARDIOLOGY | Age: 68
End: 2018-12-19
Payer: MEDICARE

## 2018-12-19 VITALS
HEIGHT: 67 IN | DIASTOLIC BLOOD PRESSURE: 78 MMHG | WEIGHT: 190 LBS | SYSTOLIC BLOOD PRESSURE: 118 MMHG | BODY MASS INDEX: 29.82 KG/M2 | HEART RATE: 72 BPM

## 2018-12-19 DIAGNOSIS — Z95.810 AICD (AUTOMATIC CARDIOVERTER/DEFIBRILLATOR) PRESENT: ICD-10-CM

## 2018-12-19 DIAGNOSIS — I50.22 CHRONIC SYSTOLIC CONGESTIVE HEART FAILURE (HCC): ICD-10-CM

## 2018-12-19 DIAGNOSIS — I42.8 NONISCHEMIC CARDIOMYOPATHY (HCC): ICD-10-CM

## 2018-12-19 DIAGNOSIS — I25.10 CORONARY ARTERY DISEASE INVOLVING NATIVE CORONARY ARTERY OF NATIVE HEART WITHOUT ANGINA PECTORIS: Primary | ICD-10-CM

## 2018-12-19 PROCEDURE — G8417 CALC BMI ABV UP PARAM F/U: HCPCS | Performed by: CLINICAL NURSE SPECIALIST

## 2018-12-19 PROCEDURE — 99213 OFFICE O/P EST LOW 20 MIN: CPT | Performed by: CLINICAL NURSE SPECIALIST

## 2018-12-19 PROCEDURE — 4040F PNEUMOC VAC/ADMIN/RCVD: CPT | Performed by: CLINICAL NURSE SPECIALIST

## 2018-12-19 PROCEDURE — 1101F PT FALLS ASSESS-DOCD LE1/YR: CPT | Performed by: CLINICAL NURSE SPECIALIST

## 2018-12-19 PROCEDURE — 1036F TOBACCO NON-USER: CPT | Performed by: CLINICAL NURSE SPECIALIST

## 2018-12-19 PROCEDURE — 1123F ACP DISCUSS/DSCN MKR DOCD: CPT | Performed by: CLINICAL NURSE SPECIALIST

## 2018-12-19 PROCEDURE — G8598 ASA/ANTIPLAT THER USED: HCPCS | Performed by: CLINICAL NURSE SPECIALIST

## 2018-12-19 PROCEDURE — 3017F COLORECTAL CA SCREEN DOC REV: CPT | Performed by: CLINICAL NURSE SPECIALIST

## 2018-12-19 PROCEDURE — 93261 INTERROGATE SUBQ DEFIB: CPT | Performed by: CLINICAL NURSE SPECIALIST

## 2018-12-19 PROCEDURE — G8427 DOCREV CUR MEDS BY ELIG CLIN: HCPCS | Performed by: CLINICAL NURSE SPECIALIST

## 2018-12-19 PROCEDURE — G8484 FLU IMMUNIZE NO ADMIN: HCPCS | Performed by: CLINICAL NURSE SPECIALIST

## 2018-12-19 NOTE — PROGRESS NOTES
History:   Procedure Laterality Date    CARDIAC CATHETERIZATION  6/9/14  JDT    diffuse CAD. EF 5%    CARDIAC DEFIBRILLATOR PLACEMENT  2014    Subcutaneous AICD, [place]    NECK SURGERY      SHOULDER SURGERY       History reviewed. No pertinent family history. Social History   Substance Use Topics    Smoking status: Former Smoker     Packs/day: 0.00     Types: Cigarettes    Smokeless tobacco: Never Used    Alcohol use Yes      Current Outpatient Prescriptions   Medication Sig Dispense Refill    Docusate Calcium (STOOL SOFTENER PO) Take 200 mg by mouth daily       Flaxseed, Linseed, (FLAX SEED OIL PO) Take 1 tablet by mouth daily      aspirin 325 MG tablet 1/2 tablet daily       carvedilol (COREG) 25 MG tablet Take 25 mg by mouth 2 times daily (with meals)      lisinopril (PRINIVIL;ZESTRIL) 5 MG tablet Take 5 mg by mouth daily      atorvastatin (LIPITOR) 40 MG tablet Take 40 mg by mouth daily      spironolactone (ALDACTONE) 25 MG tablet Take 25 mg by mouth daily       No current facility-administered medications for this visit. Allergies: Nsaids    Review of Systems  Constitutional - no significant activity change, appetite change, or unexpected weight change. No fever, chills or diaphoresis. No fatigue. HEENT - no significant rhinorrhea or epistaxis. No tinnitus or significant hearing loss. Eyes - no sudden vision change or amaurosis. Respiratory - no significant wheezing, stridor, apnea or cough. No dyspnea on exertion or shortness of breath. Cardiovascular - no exertional chest pain, orthopnea or PND. No sensation of arrhythmia or slow heart rate. No claudication or leg edema. Gastrointestinal - no abdominal swelling or pain. No blood in stool. No severe constipation, diarrhea, nausea, or vomiting. Genitourinary - no difficulty urinating, dysuria, frequency, or urgency. No flank pain or hematuria. Musculoskeletal - no back pain, gait disturbance, or myalgia.    Skin - no color change or rash. No pallor. No new surgical incision. Neurologic - no speech difficulty, facial asymmetry or lateralizing weakness. No seizures, presyncope, syncope, or significant dizziness. Hematologic - no easy bruising or excessive bleeding. Psychiatric - no severe anxiety or insomnia. No confusion. All other review of systems are negative. Objective  Vital Signs - /78   Pulse 72   Ht 5' 7\" (1.702 m)   Wt 190 lb (86.2 kg)   BMI 29.76 kg/m²   General - Na Ryley is alert, cooperative, and pleasant. Well groomed. No acute distress. Body habitus is overweight. HEENT - The head is normocephalic. No circumoral cyanosis. Dentition is normal.   EYES -  No Xanthelasma, no arcus senilis, no conjunctival hemorrhages or discharge. Neck - Supple, without increased jugular venous pressures. No carotid bruits. No mass. Respiratory - Lungs are clear bilaterally. No wheezes or rales. Normal effort without use of accessory muscles. Cardiovascular - Heart has regular rhythm and rate. No murmurs, rubs or gallops. + pedal pulses and no varicosities. Abdominal -  Soft, nontender, nondistended. Bowel sounds are intact. Extremities - No clubbing, cyanosis, or  edema. Musculoskeletal -  No clubbing . No Osler's nodes. Gait normal .  No kyphosis or scoliosis. Skin -  no statis ulcers or dermatitis. Neurological - No focal signs are identified. Oriented to person, place and time. Psychiatric -  Appropriate affect and mood. Assessment:     Diagnosis Orders   1. Coronary artery disease involving native coronary artery of native heart without angina pectoris     2. Nonischemic cardiomyopathy ([address]     3. Chronic systolic congestive heart failure ([address]     4.  AICD (automatic cardioverter/defibrillator) present       Data:  BP Readings from Last 3 Encounters:   12/19/18 118/78   09/06/18 122/62   06/01/18 126/74    Pulse Readings from Last 3 Encounters:   12/19/18 72 09/06/18 65   06/01/18 70        Rep from [employer] was here to interogate device- subcutaneous ICD  Battery at 41% but potential early depletion due to advisory on device. Demonstrated tones to wife and she is able to hear. No arrhythmias noted. Recommendations are following every 3 months. Should they here alert will need to have device replaced ASAP    Blood pressure currently well controlled. Medical management includes beta blocker, ACE inhibitor, Aldactone, aspirin and statin  No arrhythmias   States taking medications as prescribed  Stable cardiovascular status. No evidence of overt heart failure, angina or dysrhythmia. Plan    Please call if you hear any alarm or noise from device  Follow up in 3 mos With AICD check   Call with any questions or concerns  Follow up with Sriram Gonsalez MD for non cardiac problems  Report any new problems  Cardiovascular Fitness-Exercise as tolerated. Strive for 15 minutes of exercise most days of the week. Cardiac / Healthy Diet  Continue current medications as directed  Continue plan of treatment  It is always recommended that you bring your medications bottles with you to each visit - this is for your safety!        SMILEY Osorio

## 2019-03-25 RX ORDER — CARVEDILOL 25 MG/1
25 TABLET ORAL 2 TIMES DAILY WITH MEALS
Qty: 180 TABLET | Refills: 3 | Status: SHIPPED | OUTPATIENT
Start: 2019-03-25 | End: 2019-07-22 | Stop reason: SDUPTHER

## 2019-05-01 ENCOUNTER — TELEPHONE (OUTPATIENT)
Dept: CARDIOLOGY | Age: 69
End: 2019-05-01

## 2019-05-09 ENCOUNTER — OFFICE VISIT (OUTPATIENT)
Dept: CARDIOLOGY | Age: 69
End: 2019-05-09
Payer: MEDICARE

## 2019-05-09 VITALS
HEART RATE: 75 BPM | BODY MASS INDEX: 29.66 KG/M2 | WEIGHT: 189 LBS | HEIGHT: 67 IN | DIASTOLIC BLOOD PRESSURE: 62 MMHG | SYSTOLIC BLOOD PRESSURE: 122 MMHG

## 2019-05-09 DIAGNOSIS — I50.22 CHRONIC SYSTOLIC CONGESTIVE HEART FAILURE (HCC): ICD-10-CM

## 2019-05-09 DIAGNOSIS — Z95.810 AICD (AUTOMATIC CARDIOVERTER/DEFIBRILLATOR) PRESENT: ICD-10-CM

## 2019-05-09 DIAGNOSIS — I25.10 CORONARY ARTERY DISEASE INVOLVING NATIVE CORONARY ARTERY OF NATIVE HEART WITHOUT ANGINA PECTORIS: Primary | ICD-10-CM

## 2019-05-09 DIAGNOSIS — E78.2 MIXED HYPERLIPIDEMIA: ICD-10-CM

## 2019-05-09 PROCEDURE — 1036F TOBACCO NON-USER: CPT | Performed by: CLINICAL NURSE SPECIALIST

## 2019-05-09 PROCEDURE — G8598 ASA/ANTIPLAT THER USED: HCPCS | Performed by: CLINICAL NURSE SPECIALIST

## 2019-05-09 PROCEDURE — 93261 INTERROGATE SUBQ DEFIB: CPT | Performed by: CLINICAL NURSE SPECIALIST

## 2019-05-09 PROCEDURE — 3017F COLORECTAL CA SCREEN DOC REV: CPT | Performed by: CLINICAL NURSE SPECIALIST

## 2019-05-09 PROCEDURE — G8427 DOCREV CUR MEDS BY ELIG CLIN: HCPCS | Performed by: CLINICAL NURSE SPECIALIST

## 2019-05-09 PROCEDURE — 4040F PNEUMOC VAC/ADMIN/RCVD: CPT | Performed by: CLINICAL NURSE SPECIALIST

## 2019-05-09 PROCEDURE — G8417 CALC BMI ABV UP PARAM F/U: HCPCS | Performed by: CLINICAL NURSE SPECIALIST

## 2019-05-09 PROCEDURE — 99213 OFFICE O/P EST LOW 20 MIN: CPT | Performed by: CLINICAL NURSE SPECIALIST

## 2019-05-09 PROCEDURE — 1123F ACP DISCUSS/DSCN MKR DOCD: CPT | Performed by: CLINICAL NURSE SPECIALIST

## 2019-05-09 NOTE — PROGRESS NOTES
[address] [place], [address] [address]  Phone: [phone]  Fax: [phone]    OFFICE VISIT:  5/9/2019    [address]: 1950    Reason For Visit:  Mariaa Abernathy is a 79 y.o. male who is here for Follow-up (ICD  no cardiac symptoms); Coronary Artery Disease; and Congestive Heart Failure  Heart catheterization 2014 showed diffuse CAD with EF approximately 5%  Patient has subcutaneous AICD [facility] in 2014 [facility]    He returns today in follow-up. States overall he is active and doing well. He's had no complaints or symptoms of recurrent heart failure. Wife wants to know what can of exercise he can do if they can switch him    Subjective  Mariaa Abernathy denies exertional chest pain, shortness of breath, orthopnea, paroxysmal nocturnal dyspnea, syncope, presyncope, arrhythmia, edema and fatigue. The patient denies numbness or weakness to suggest cerebrovascular accident or transient ischemic attack. Dunia Reich MD is PCP and follows labs including lipids.   Britany Barboza has the following history as recorded in [facility]:    Patient Active Problem List    Diagnosis Date Noted    Chronic systolic congestive heart failure ([address] 06/01/2018    Coronary artery disease involving native coronary artery of native heart without angina pectoris 10/04/2017    Nonischemic cardiomyopathy ([address] 10/04/2017    Mixed hyperlipidemia 10/04/2017    Type 2 diabetes mellitus with complication, with long-term current use of insulin ([address] 10/04/2017    AICD (automatic cardioverter/defibrillator) present 10/04/2017     Past Medical History:   Diagnosis Date    AICD (automatic cardioverter/defibrillator) present 10/4/2017    Cardiac defibrillator in place     Chronic systolic congestive heart failure ([address] 6/1/2018    Coronary artery disease involving native coronary artery of native heart without angina pectoris 10/4/2017    Hypertension     Mixed hyperlipidemia 10/4/2017    Myocardial Detail Level: Zone Detail Level: Simple infarction [facility]     Nonischemic cardiomyopathy ([facility] 10/4/2017    Type 2 diabetes mellitus with complication, with long-term current use of insulin ([facility] 10/4/2017     Past Surgical History:   Procedure Laterality Date    CARDIAC CATHETERIZATION  6/9/14  JDT    diffuse CAD. EF 5%    CARDIAC DEFIBRILLATOR PLACEMENT  2014    Subcutaneous AICD, [place]    NECK SURGERY      SHOULDER SURGERY       History reviewed. No pertinent family history. Social History     Tobacco Use    Smoking status: Former Smoker     Packs/day: 0.00     Types: Cigarettes    Smokeless tobacco: Never Used   Substance Use Topics    Alcohol use: Yes      Current Outpatient Medications   Medication Sig Dispense Refill    carvedilol (COREG) 25 MG tablet Take 1 tablet by mouth 2 times daily (with meals) 180 tablet 3    Docusate Calcium (STOOL SOFTENER PO) Take 200 mg by mouth daily       Flaxseed, Linseed, (FLAX SEED OIL PO) Take 1 tablet by mouth daily      aspirin 325 MG tablet 1/2 tablet daily       lisinopril (PRINIVIL;ZESTRIL) 5 MG tablet Take 5 mg by mouth daily      atorvastatin (LIPITOR) 40 MG tablet Take 40 mg by mouth daily      spironolactone (ALDACTONE) 25 MG tablet Take 25 mg by mouth daily       No current facility-administered medications for this visit. Allergies: Nsaids    Review of Systems  Constitutional - no significant activity change, appetite change, or unexpected weight change. No fever, chills or diaphoresis. No fatigue. HEENT - no significant rhinorrhea or epistaxis. No tinnitus or significant hearing loss. Eyes - no sudden vision change or amaurosis. Respiratory - no significant wheezing, stridor, apnea or cough. No dyspnea on exertion or shortness of breath. Cardiovascular - no exertional chest pain, orthopnea or PND. No sensation of arrhythmia or slow heart rate. No claudication or leg edema. Gastrointestinal - no abdominal swelling or pain. No blood in stool.  No severe constipation, diarrhea, nausea, or vomiting. Genitourinary - no difficulty urinating, dysuria, frequency, or urgency. No flank pain or hematuria. Musculoskeletal - no back pain, gait disturbance, or myalgia. Skin - no color change or rash. No pallor. No new surgical incision. Neurologic - no speech difficulty, facial asymmetry or lateralizing weakness. No seizures, presyncope, syncope, or significant dizziness. Hematologic - no easy bruising or excessive bleeding. Psychiatric - no severe anxiety or insomnia. No confusion. All other review of systems are negative. Objective  Vital Signs - /62   Pulse 75   Ht 5' 7\" (1.702 m)   Wt 189 lb (85.7 kg)   BMI 29.60 kg/m²   General - Quinton Charles is alert, cooperative, and pleasant. Well groomed. No acute distress. Body habitus is overweight. HEENT - The head is normocephalic. No circumoral cyanosis. Dentition is normal.   EYES -  No Xanthelasma, no arcus senilis, no conjunctival hemorrhages or discharge. Neck - Supple, without increased jugular venous pressures. No carotid bruits. No mass. Respiratory - Lungs are clear bilaterally. No wheezes or rales. Normal effort without use of accessory muscles. Cardiovascular - Heart has regular rhythm and rate. No murmurs, rubs or gallops. + pedal pulses and no varicosities. Abdominal -  Soft, nontender, nondistended. Bowel sounds are intact. Extremities - No clubbing, cyanosis, or  edema. Musculoskeletal -  No clubbing . No Osler's nodes. Gait normal .  No kyphosis or scoliosis. Skin -  no statis ulcers or dermatitis. Neurological - No focal signs are identified. Oriented to person, place and time. Psychiatric -  Appropriate affect and mood. Assessment:     Diagnosis Orders   1. Coronary artery disease involving native coronary artery of native heart without angina pectoris     2. AICD (automatic cardioverter/defibrillator) present     3.  Chronic systolic congestive heart failure ([address]     4. Mixed hyperlipidemia       Data:  BP Readings from Last 3 Encounters:   05/09/19 122/62   12/19/18 118/78   09/06/18 122/62    Pulse Readings from Last 3 Encounters:   05/09/19 75   12/19/18 72   09/06/18 65        Wt Readings from Last 3 Encounters:   05/09/19 189 lb (85.7 kg)   12/19/18 190 lb (86.2 kg)   09/06/18 190 lb (86.2 kg)     Rep from [employer] was here to interogate device- subcutaneous ICD  Battery at 41% but potential early depletion due to advisory on device. Demonstrated tones to wife and she is able to hear. No arrhythmias noted. Recommendations are following every 3 months. Should they here alert will need to have device replaced ASAP    Recommend some light daily exercise. Should not exert himself where they become overly short of breath or unable to maintain conversation   States taking medications as prescribed  Stable cardiovascular status. No evidence of overt heart failure, angina or dysrhythmia. Plan  Follow up 3 mos for device check only  Follow up in 6 mos  With Dr. Garay Ice   Call with any questions or concerns  Follow up with Balwinder Rosario MD for non cardiac problems  Report any new problems  Cardiovascular Fitness-Exercise as tolerated. Strive for 30 minutes of exercise most days of the week. Cardiac / Healthy Diet  Continue current medications as directed  Continue plan of treatment  It is always recommended that you bring your medications bottles with you to each visit - this is for your safety! SMILEY Ramos    EMR dragon/transcription disclaimer: Much of this encounter note is electronic transcription/translation of spoken language to printed tach. Electronic translation of spoken language may be erroneous, or at times, nonsensical words or phrases may be inadvertently transcribed.  Although, I have reviewed the note for such errors, some may still exist.

## 2019-07-22 RX ORDER — LISINOPRIL 5 MG/1
5 TABLET ORAL DAILY
Qty: 90 TABLET | Refills: 3 | Status: SHIPPED | OUTPATIENT
Start: 2019-07-22 | End: 2020-10-06

## 2019-07-22 RX ORDER — CARVEDILOL 25 MG/1
25 TABLET ORAL 2 TIMES DAILY WITH MEALS
Qty: 180 TABLET | Refills: 3 | Status: SHIPPED | OUTPATIENT
Start: 2019-07-22 | End: 2020-10-06

## 2019-08-12 ENCOUNTER — TELEPHONE (OUTPATIENT)
Dept: CARDIOLOGY | Age: 69
End: 2019-08-12

## 2019-08-13 ENCOUNTER — OFFICE VISIT (OUTPATIENT)
Dept: CARDIOLOGY | Age: 69
End: 2019-08-13
Payer: MEDICARE

## 2019-08-13 VITALS
SYSTOLIC BLOOD PRESSURE: 128 MMHG | WEIGHT: 190 LBS | BODY MASS INDEX: 29.82 KG/M2 | HEART RATE: 75 BPM | HEIGHT: 67 IN | DIASTOLIC BLOOD PRESSURE: 78 MMHG

## 2019-08-13 DIAGNOSIS — I25.10 CORONARY ARTERY DISEASE INVOLVING NATIVE CORONARY ARTERY OF NATIVE HEART WITHOUT ANGINA PECTORIS: Primary | ICD-10-CM

## 2019-08-13 DIAGNOSIS — I50.22 CHRONIC SYSTOLIC CONGESTIVE HEART FAILURE (HCC): ICD-10-CM

## 2019-08-13 DIAGNOSIS — Z95.810 AICD (AUTOMATIC CARDIOVERTER/DEFIBRILLATOR) PRESENT: ICD-10-CM

## 2019-08-13 PROCEDURE — 93261 INTERROGATE SUBQ DEFIB: CPT | Performed by: CLINICAL NURSE SPECIALIST

## 2019-12-09 ENCOUNTER — TELEPHONE (OUTPATIENT)
Dept: CARDIOLOGY | Age: 69
End: 2019-12-09

## 2020-01-06 ENCOUNTER — TELEPHONE (OUTPATIENT)
Dept: CARDIOLOGY | Age: 70
End: 2020-01-06

## 2020-02-14 ENCOUNTER — TELEPHONE (OUTPATIENT)
Dept: CARDIOLOGY | Age: 70
End: 2020-02-14

## 2020-02-17 ENCOUNTER — OFFICE VISIT (OUTPATIENT)
Dept: CARDIOLOGY | Age: 70
End: 2020-02-17
Payer: MEDICARE

## 2020-02-17 VITALS
SYSTOLIC BLOOD PRESSURE: 136 MMHG | WEIGHT: 187 LBS | OXYGEN SATURATION: 97 % | BODY MASS INDEX: 29.35 KG/M2 | HEIGHT: 67 IN | DIASTOLIC BLOOD PRESSURE: 80 MMHG

## 2020-02-17 PROCEDURE — G8417 CALC BMI ABV UP PARAM F/U: HCPCS | Performed by: NURSE PRACTITIONER

## 2020-02-17 PROCEDURE — G8427 DOCREV CUR MEDS BY ELIG CLIN: HCPCS | Performed by: NURSE PRACTITIONER

## 2020-02-17 PROCEDURE — 4040F PNEUMOC VAC/ADMIN/RCVD: CPT | Performed by: NURSE PRACTITIONER

## 2020-02-17 PROCEDURE — 1036F TOBACCO NON-USER: CPT | Performed by: NURSE PRACTITIONER

## 2020-02-17 PROCEDURE — 99214 OFFICE O/P EST MOD 30 MIN: CPT | Performed by: NURSE PRACTITIONER

## 2020-02-17 PROCEDURE — G8484 FLU IMMUNIZE NO ADMIN: HCPCS | Performed by: NURSE PRACTITIONER

## 2020-02-17 PROCEDURE — 1123F ACP DISCUSS/DSCN MKR DOCD: CPT | Performed by: NURSE PRACTITIONER

## 2020-02-17 PROCEDURE — 3017F COLORECTAL CA SCREEN DOC REV: CPT | Performed by: NURSE PRACTITIONER

## 2020-02-17 NOTE — PROGRESS NOTES
[facility] Cardiology   Established Patient Office Visit  [address] [facility]. [address] [facility]  [phone]        OFFICE VISIT:  2/17/2020    [address] [place] [address] Street: 1950    Reason For Visit:  Srikanth Treviño is a 81 y.o. male who is here for Follow-up (no cardiac issues ); Hypertension; and Coronary Artery Disease    1. Coronary artery disease involving native coronary artery of native heart without angina pectoris    2. Chronic systolic congestive heart failure ([address]    3. Essential hypertension      Patient with a history of coronary artery disease, cardiomyopathy, chronic systolic congestive heart failure and hypertension. He is a patient of Dr. Shauna Riedel. Patient presents to clinic today for 6-month follow-up. Patient denies any complaints of chest pain, pressure or tightness. There is no shortness of breath, orthopnea or PND. Patient denies any lightheadedness, dizziness or syncope.       Bibi    Senthilus Sequeira is a 81 y.o. male with the following history as recorded in [facility]:    Patient Active Problem List    Diagnosis Date Noted    Chronic systolic congestive heart failure ([address] 06/01/2018    Coronary artery disease involving native coronary artery of native heart without angina pectoris 10/04/2017    Nonischemic cardiomyopathy ([address] 10/04/2017    Mixed hyperlipidemia 10/04/2017    Type 2 diabetes mellitus with complication, with long-term current use of insulin ([address] 10/04/2017    AICD (automatic cardioverter/defibrillator) present 10/04/2017     Current Outpatient Medications   Medication Sig Dispense Refill    carvedilol (COREG) 25 MG tablet Take 1 tablet by mouth 2 times daily (with meals) 180 tablet 3    lisinopril (PRINIVIL;ZESTRIL) 5 MG tablet Take 1 tablet by mouth daily 90 tablet 3    Docusate Calcium (STOOL SOFTENER PO) Take 200 mg by mouth daily       Flaxseed, Linseed, (FLAX SEED OIL PO) Take 1 tablet by mouth daily      aspirin 325 MG tablet 1/2 tablet daily        No current facility-administered medications for this visit. Allergies: Nsaids  Past Medical History:   Diagnosis Date    AICD (automatic cardioverter/defibrillator) present 10/4/2017    Cardiac defibrillator in place     Chronic systolic congestive heart failure ([facility] 6/1/2018    Coronary artery disease involving native coronary artery of native heart without angina pectoris 10/4/2017    Hypertension     Mixed hyperlipidemia 10/4/2017    Myocardial infarction ([facility]     Nonischemic cardiomyopathy ([facility] 10/4/2017    Type 2 diabetes mellitus with complication, with long-term current use of insulin ([facility] 10/4/2017     Past Surgical History:   Procedure Laterality Date    CARDIAC CATHETERIZATION  6/9/14  JDT    diffuse CAD. EF 5%    CARDIAC DEFIBRILLATOR PLACEMENT  2014    Subcutaneous AICD, [place]    NECK SURGERY      SHOULDER SURGERY       History reviewed. No pertinent family history.   Social History     Tobacco Use    Smoking status: Former Smoker     Packs/day: 0.00     Types: Cigarettes    Smokeless tobacco: Never Used   Substance Use Topics    Alcohol use: Yes          Review of Systems:    General:      Complaint / Symptom Yes / No / Description if Yes       Fatigue No   Weight gain N/A   Insomnia N/A       Respiratory:        Complaint / Symptom Yes / No / Description if Yes       Cough No   Horseness N/A       Cardiovascular:    Complaint / Symptom Yes / No / Description if Yes       Chest Pain No   Shortness of Air / Orthopnea No   Presyncope / Syncope No   Palpitations No         Objective:    /80   Ht 5' 7\" (1.702 m)   Wt 187 lb (84.8 kg)   SpO2 97%   BMI 29.29 kg/m²     GENERAL - well developed and well nourished, conversant  HEENT -  PERRLA, Hearing appears normal, gentleman lids are normal.  External inspection of ears and nose appear normal.  NECK - no thyromegaly, no JVD, trachea is in the midline  CARDIOVASCULAR - PMI is in the mid line clavicular position, Normal S1 and S2 with no systolic murmur. No S3 or S4    PULMONARY - no respiratory distress. No wheezes or rales. Lungs are clear to ausculation, normal respiratory effort. ABDOMEN  - soft, non tender, no rebound  MUSCULOSKELETAL  - range of motion of the upper and lower extermites appears normal and equal and is without pain   EXTREMITIES - no significant edema   NEUROLOGIC - gait and station are normal  SKIN - turgor is normal, can warm and dry. PSYCHIATRIC - normal mood and affect, alert and orientated x 3,      ASSESSMENT:    ALL THE CARDIOLOGY PROBLEMS ARE LISTED ABOVE; HOWEVER, THE FOLLOWING SPECIFIC CARDIAC PROBLEMS / CONDITIONS WERE ADDRESSED AND TREATED DURING THE OFFICE VISIT TODAY:                                                                                            MEDICAL DECISION MAKING             Cardiac Specific Problem / Diagnosis  Discussion and Data Reviewed Diagnostic Procedures Ordered Management Options Selected           1. CAD  Stable   Review and summation of old records:    No chest pain. No Continue current medications:     Yes           2. Cardiomyopathy  Stable   No overt signs of failure. Subcutaneous AICD interrogation today by representative from [employer]. Normal function with no changes. Battery life 25%. Recheck in 3 months. Yes Continue current medications:    Yes           3. Hypertension Stable  blood pressure in the office today 136/80. O2 sat 97%. No Continue current medications:       Yes           4. Chronic systolic congestive heart failure Stable  no overt signs of failure. No Continue current medications:       Yes     No orders of the defined types were placed in this encounter. No orders of the defined types were placed in this encounter. Discussed with patient and spouse. Return in about 3 months (around 5/17/2020) for Device check ([address] needed).     I greatly appreciate the opportunity to meet

## 2020-04-23 ENCOUNTER — TELEPHONE (OUTPATIENT)
Dept: CARDIOLOGY | Age: 70
End: 2020-04-23

## 2020-06-05 ENCOUNTER — TELEPHONE (OUTPATIENT)
Dept: CARDIOLOGY | Age: 70
End: 2020-06-05

## 2020-10-02 ENCOUNTER — TELEPHONE (OUTPATIENT)
Dept: CARDIOLOGY | Age: 70
End: 2020-10-02

## 2020-10-06 RX ORDER — CARVEDILOL 25 MG/1
TABLET ORAL
Qty: 180 TABLET | Refills: 0 | Status: SHIPPED | OUTPATIENT
Start: 2020-10-06 | End: 2021-01-07 | Stop reason: SDUPTHER

## 2020-10-06 RX ORDER — LISINOPRIL 5 MG/1
TABLET ORAL
Qty: 90 TABLET | Refills: 0 | Status: SHIPPED | OUTPATIENT
Start: 2020-10-06 | End: 2021-01-07 | Stop reason: SDUPTHER

## 2020-10-12 ENCOUNTER — OFFICE VISIT (OUTPATIENT)
Dept: CARDIOLOGY | Age: 70
End: 2020-10-12
Payer: MEDICARE

## 2020-10-12 VITALS
WEIGHT: 178 LBS | BODY MASS INDEX: 27.94 KG/M2 | HEART RATE: 64 BPM | SYSTOLIC BLOOD PRESSURE: 126 MMHG | OXYGEN SATURATION: 98 % | DIASTOLIC BLOOD PRESSURE: 74 MMHG | HEIGHT: 67 IN

## 2020-10-12 PROCEDURE — 3017F COLORECTAL CA SCREEN DOC REV: CPT | Performed by: NURSE PRACTITIONER

## 2020-10-12 PROCEDURE — 99213 OFFICE O/P EST LOW 20 MIN: CPT | Performed by: NURSE PRACTITIONER

## 2020-10-12 PROCEDURE — 3046F HEMOGLOBIN A1C LEVEL >9.0%: CPT | Performed by: NURSE PRACTITIONER

## 2020-10-12 PROCEDURE — G8417 CALC BMI ABV UP PARAM F/U: HCPCS | Performed by: NURSE PRACTITIONER

## 2020-10-12 PROCEDURE — 2022F DILAT RTA XM EVC RTNOPTHY: CPT | Performed by: NURSE PRACTITIONER

## 2020-10-12 PROCEDURE — 1036F TOBACCO NON-USER: CPT | Performed by: NURSE PRACTITIONER

## 2020-10-12 PROCEDURE — 1123F ACP DISCUSS/DSCN MKR DOCD: CPT | Performed by: NURSE PRACTITIONER

## 2020-10-12 PROCEDURE — 93000 ELECTROCARDIOGRAM COMPLETE: CPT | Performed by: NURSE PRACTITIONER

## 2020-10-12 PROCEDURE — G8427 DOCREV CUR MEDS BY ELIG CLIN: HCPCS | Performed by: NURSE PRACTITIONER

## 2020-10-12 PROCEDURE — G8484 FLU IMMUNIZE NO ADMIN: HCPCS | Performed by: NURSE PRACTITIONER

## 2020-10-12 PROCEDURE — 4040F PNEUMOC VAC/ADMIN/RCVD: CPT | Performed by: NURSE PRACTITIONER

## 2020-10-12 ASSESSMENT — ENCOUNTER SYMPTOMS
EYE DISCHARGE: 0
ABDOMINAL PAIN: 0
VOMITING: 0
WHEEZING: 0
BLOOD IN STOOL: 0
EYE REDNESS: 0
COLOR CHANGE: 0
ABDOMINAL DISTENTION: 0
NAUSEA: 0
TROUBLE SWALLOWING: 0
COUGH: 0
SHORTNESS OF BREATH: 0
FACIAL SWELLING: 0

## 2020-10-12 NOTE — PROGRESS NOTES
[address] [place] [address] Cardiology  [address]  [address]  Phone: [phone]  Fax: [phone]    OFFICE VISIT:  10/12/2020    [address] [place] [address] Street: 1950    Reason For Visit:  Ivett Carrillo is a 78 y.o. male who is here for 6 Month Follow-Up (no cardiac symptoms) and Coronary Artery Disease      HPI    Mr. Tena Garrison is a 78 y.o. male with history of coronary artery disease, hypertension, hyperlipidemia, diabetes, former nicotine dependence, and ischemic cardiomyopathy with AICD in place who presents with the chief complaint of six-month follow-up. Patient states he walks his dog through the fields approximately 2 miles every day without difficulties. He reports no cardiac symptoms. Ivett Carrillo has no exertional chest pain, pressure, burning, tightness or squeezing. No symptomatic tachy- or didi-arrhythmia. No lightheadedness, dizziness, or syncope. No numbness or weakness to suggest cerebrovascular accident or transient ischemic attack. he denies signs of bleeding. Reports no edema or shortness of breath. He denies orthopnea or paroxysmal nocturnal dyspnea. he is sleeping on 1 pillow at night. he has been compliant with his medications. his BP has been controlled at home. he reports no activity change. PCP follows lipids and labs. Sonido Carroll MD is PCP.   Megan Segovia has the following history as recorded in [facility]:    Patient Active Problem List    Diagnosis Date Noted    Chronic systolic congestive heart failure ([address] 06/01/2018    Coronary artery disease involving native coronary artery of native heart without angina pectoris 10/04/2017    Nonischemic cardiomyopathy ([address] 10/04/2017    Mixed hyperlipidemia 10/04/2017    Type 2 diabetes mellitus with complication, with long-term current use of insulin ([address] 10/04/2017    AICD (automatic cardioverter/defibrillator) present 10/04/2017     Past Medical History:   Diagnosis Date    AICD (automatic cardioverter/defibrillator) present 10/4/2017    Cardiac defibrillator in place     Chronic systolic congestive heart failure ([facility] 6/1/2018    Coronary artery disease involving native coronary artery of native heart without angina pectoris 10/4/2017    Hypertension     Mixed hyperlipidemia 10/4/2017    Myocardial infarction ([facility]     Nonischemic cardiomyopathy ([facility] 10/4/2017    Type 2 diabetes mellitus with complication, with long-term current use of insulin ([facility] 10/4/2017     Past Surgical History:   Procedure Laterality Date    CARDIAC CATHETERIZATION  6/9/14  JDT    diffuse CAD. EF 5%    CARDIAC DEFIBRILLATOR PLACEMENT  2014    Subcutaneous AICD, [place]    NECK SURGERY      SHOULDER SURGERY       History reviewed. No pertinent family history. Social History     Tobacco Use    Smoking status: Former Smoker     Packs/day: 0.00     Types: Cigarettes    Smokeless tobacco: Never Used   Substance Use Topics    Alcohol use: Yes      Current Outpatient Medications   Medication Sig Dispense Refill    DOCUSATE CALCIUM PO Take 250 mg by mouth daily      carvedilol (COREG) 25 MG tablet TAKE 1 TABLET BY MOUTH TWICE DAILY WITH MEALS 180 tablet 0    lisinopril (PRINIVIL;ZESTRIL) 5 MG tablet Take 1 tablet by mouth once daily 90 tablet 0    Flaxseed, Linseed, (FLAX SEED OIL PO) Take 1 tablet by mouth daily      aspirin 325 MG tablet 1/2 tablet daily        No current facility-administered medications for this visit. Allergies: Nsaids    Review of Systems  Review of Systems   Constitutional: Negative for activity change, diaphoresis, fatigue, fever and unexpected weight change. HENT: Negative for facial swelling, nosebleeds and trouble swallowing. Eyes: Negative for discharge, redness and visual disturbance. Respiratory: Negative for cough, shortness of breath and wheezing. Cardiovascular: Negative for chest pain, palpitations and leg swelling.    Gastrointestinal: Negative for abdominal distention, abdominal pain, blood in stool, nausea and vomiting. Endocrine: Negative for cold intolerance and heat intolerance. Genitourinary: Negative for dysuria and hematuria. Musculoskeletal: Negative for gait problem. Skin: Negative for color change, pallor and rash. Neurological: Negative for dizziness, syncope, facial asymmetry and light-headedness. Hematological: Does not bruise/bleed easily. Psychiatric/Behavioral: Negative for behavioral problems and confusion. All other systems reviewed and are negative. Objective  Vital Signs - /74   Pulse 64   Ht 5' 7\" (1.702 m)   Wt 178 lb (80.7 kg)   SpO2 98%   BMI 27.88 kg/m²   Physical Exam  Vitals signs and nursing note reviewed. Constitutional:       Appearance: He is well-developed. HENT:      Head: Normocephalic and atraumatic. Right Ear: External ear normal.      Left Ear: External ear normal.      Nose: Nose normal.   Eyes:      General:         Right eye: No discharge. Left eye: No discharge. Pupils: Pupils are equal, round, and reactive to light. Neck:      Musculoskeletal: Normal range of motion. No edema. Vascular: No carotid bruit or JVD. Trachea: No tracheal deviation. Cardiovascular:      Rate and Rhythm: Normal rate and regular rhythm. Heart sounds: Murmur (1/6) present. No friction rub. No gallop. Pulmonary:      Effort: Pulmonary effort is normal. No respiratory distress. Breath sounds: No wheezing, rhonchi or rales. Abdominal:      General: Bowel sounds are normal. There is no distension. Palpations: Abdomen is soft. Tenderness: There is no abdominal tenderness. Musculoskeletal: Normal range of motion. Skin:     General: Skin is warm and dry. Capillary Refill: Capillary refill takes less than 2 seconds. Findings: No rash. Neurological:      Mental Status: He is alert and oriented to person, place, and time.    Psychiatric:         Behavior: dictation was generated by voice recognition computer software. Although all attempts are made to edit dictation for accuracy, there may be errors in the transcription that are not intended.

## 2021-01-04 ENCOUNTER — TELEPHONE (OUTPATIENT)
Dept: CARDIOLOGY CLINIC | Age: 71
End: 2021-01-04

## 2021-01-06 ENCOUNTER — TELEPHONE (OUTPATIENT)
Dept: CARDIOLOGY CLINIC | Age: 71
End: 2021-01-06

## 2021-01-06 NOTE — TELEPHONE ENCOUNTER
Appt should have been rsded out 1 more week, called and left msg for pt toret call & rsd apt out 1 additional wk.

## 2021-01-07 RX ORDER — LISINOPRIL 5 MG/1
5 TABLET ORAL DAILY
Qty: 90 TABLET | Refills: 3 | Status: SHIPPED | OUTPATIENT
Start: 2021-01-07 | End: 2021-01-18

## 2021-01-07 RX ORDER — CARVEDILOL 25 MG/1
25 TABLET ORAL 2 TIMES DAILY
Qty: 180 TABLET | Refills: 3 | Status: SHIPPED | OUTPATIENT
Start: 2021-01-07 | End: 2021-01-18

## 2021-01-15 ENCOUNTER — TELEPHONE (OUTPATIENT)
Dept: CARDIOLOGY CLINIC | Age: 71
End: 2021-01-15

## 2021-01-15 NOTE — TELEPHONE ENCOUNTER
Tried to call patient on home phone and mobile and had to L/M to see if he could come in on Monday 1/18/21 at 11:30 instead of 12:45 because that is when [email or web address] can come to check him. [address] [facility] [address]

## 2021-01-18 ENCOUNTER — OFFICE VISIT (OUTPATIENT)
Dept: CARDIOLOGY CLINIC | Age: 71
End: 2021-01-18
Payer: MEDICARE

## 2021-01-18 VITALS
HEIGHT: 67 IN | BODY MASS INDEX: 28.41 KG/M2 | OXYGEN SATURATION: 99 % | DIASTOLIC BLOOD PRESSURE: 88 MMHG | WEIGHT: 181 LBS | SYSTOLIC BLOOD PRESSURE: 132 MMHG | HEART RATE: 70 BPM

## 2021-01-18 DIAGNOSIS — E11.8 TYPE 2 DIABETES MELLITUS WITH COMPLICATION, WITH LONG-TERM CURRENT USE OF INSULIN (HCC): ICD-10-CM

## 2021-01-18 DIAGNOSIS — I42.8 NONISCHEMIC CARDIOMYOPATHY (HCC): ICD-10-CM

## 2021-01-18 DIAGNOSIS — Z79.4 TYPE 2 DIABETES MELLITUS WITH COMPLICATION, WITH LONG-TERM CURRENT USE OF INSULIN (HCC): ICD-10-CM

## 2021-01-18 DIAGNOSIS — E78.2 MIXED HYPERLIPIDEMIA: ICD-10-CM

## 2021-01-18 DIAGNOSIS — Z95.810 AICD (AUTOMATIC CARDIOVERTER/DEFIBRILLATOR) PRESENT: ICD-10-CM

## 2021-01-18 DIAGNOSIS — I10 ESSENTIAL HYPERTENSION: ICD-10-CM

## 2021-01-18 DIAGNOSIS — I25.10 CORONARY ARTERY DISEASE INVOLVING NATIVE CORONARY ARTERY OF NATIVE HEART WITHOUT ANGINA PECTORIS: Primary | ICD-10-CM

## 2021-01-18 PROCEDURE — G8427 DOCREV CUR MEDS BY ELIG CLIN: HCPCS | Performed by: NURSE PRACTITIONER

## 2021-01-18 PROCEDURE — 1036F TOBACCO NON-USER: CPT | Performed by: NURSE PRACTITIONER

## 2021-01-18 PROCEDURE — G8417 CALC BMI ABV UP PARAM F/U: HCPCS | Performed by: NURSE PRACTITIONER

## 2021-01-18 PROCEDURE — 1123F ACP DISCUSS/DSCN MKR DOCD: CPT | Performed by: NURSE PRACTITIONER

## 2021-01-18 PROCEDURE — 99213 OFFICE O/P EST LOW 20 MIN: CPT | Performed by: NURSE PRACTITIONER

## 2021-01-18 PROCEDURE — 3046F HEMOGLOBIN A1C LEVEL >9.0%: CPT | Performed by: NURSE PRACTITIONER

## 2021-01-18 PROCEDURE — 4040F PNEUMOC VAC/ADMIN/RCVD: CPT | Performed by: NURSE PRACTITIONER

## 2021-01-18 PROCEDURE — G8484 FLU IMMUNIZE NO ADMIN: HCPCS | Performed by: NURSE PRACTITIONER

## 2021-01-18 PROCEDURE — 3017F COLORECTAL CA SCREEN DOC REV: CPT | Performed by: NURSE PRACTITIONER

## 2021-01-18 PROCEDURE — 2022F DILAT RTA XM EVC RTNOPTHY: CPT | Performed by: NURSE PRACTITIONER

## 2021-01-18 RX ORDER — MULTIVIT-MIN/FA/LYCOPEN/LUTEIN .4-300-25
1 TABLET ORAL DAILY
COMMUNITY

## 2021-01-18 RX ORDER — LISINOPRIL 5 MG/1
TABLET ORAL
Qty: 90 TABLET | Refills: 0 | Status: SHIPPED | OUTPATIENT
Start: 2021-01-18 | End: 2021-04-12

## 2021-01-18 RX ORDER — CARVEDILOL 25 MG/1
TABLET ORAL
Qty: 180 TABLET | Refills: 0 | Status: SHIPPED | OUTPATIENT
Start: 2021-01-18 | End: 2021-04-12 | Stop reason: SDUPTHER

## 2021-01-18 ASSESSMENT — ENCOUNTER SYMPTOMS
EYE REDNESS: 0
SHORTNESS OF BREATH: 0
NAUSEA: 0
BLOOD IN STOOL: 0
TROUBLE SWALLOWING: 0
ABDOMINAL DISTENTION: 0
COLOR CHANGE: 0
COUGH: 0
VOMITING: 0
WHEEZING: 0
FACIAL SWELLING: 0
ABDOMINAL PAIN: 0
EYE DISCHARGE: 0

## 2021-01-18 NOTE — PROGRESS NOTES
[address] [place] [address] Cardiology  [address]  [address]  Phone: [phone]  Fax: [phone]    OFFICE VISIT:  1/18/2021    [address] [place] [address] Street: 1950    Reason For Visit:  Leopoldo Falconer is a 78 y.o. male who is here for 3 Month Follow-Up (no cardiac symptoms) and Coronary Artery Disease      HPI    Mr. Zahra Carpenter is a 78 y.o. male with history of coronary artery disease, hypertension, hyperlipidemia, diabetes, former nicotine dependence, and ischemic cardiomyopathy with AICD in place who presents with the chief complaint of six-month follow-up. Patient states since previous appointment he has been doing well. He remains active in the fields without difficulties. He reports no cardiac symptoms. His AICD is nearing end-of-life on battery. Kior with [employer] was here today to interrogate. She states this is subcutaneous device and no one at [facility] is able to change out the battery. Leopoldo Falconer has no exertional chest pain, pressure, burning, tightness or squeezing. No symptomatic tachy- or didi-arrhythmia. No lightheadedness, dizziness, or syncope. No numbness or weakness to suggest cerebrovascular accident or transient ischemic attack. he denies signs of bleeding. Reports no edema or shortness of breath. He denies orthopnea or paroxysmal nocturnal dyspnea. he is sleeping on 1 pillow at night. he has been compliant with his medications. his BP has been controlled at home. he reports no activity change. PCP follows lipids and labs. Edris Dandy, MD is PCP.   Kenny Cooper has the following history as recorded in [facility]:    Patient Active Problem List    Diagnosis Date Noted    Essential hypertension 01/18/2021    Chronic systolic congestive heart failure ([facility] 06/01/2018    Coronary artery disease involving native coronary artery of native heart without angina pectoris 10/04/2017    Nonischemic cardiomyopathy ([facility] 10/04/2017    Mixed hyperlipidemia 10/04/2017    Type 2 diabetes mellitus with complication, with long-term current use of insulin ([facility] 10/04/2017    AICD (automatic cardioverter/defibrillator) present 10/04/2017     Past Medical History:   Diagnosis Date    AICD (automatic cardioverter/defibrillator) present 10/4/2017    Cardiac defibrillator in place     Chronic systolic congestive heart failure ([facility] 6/1/2018    Coronary artery disease involving native coronary artery of native heart without angina pectoris 10/4/2017    Hypertension     Mixed hyperlipidemia 10/4/2017    Myocardial infarction ([address]     Nonischemic cardiomyopathy ([address] 10/4/2017    Type 2 diabetes mellitus with complication, with long-term current use of insulin ([facility] 10/4/2017     Past Surgical History:   Procedure Laterality Date    CARDIAC CATHETERIZATION  6/9/14  JDT    diffuse CAD. EF 5%    CARDIAC DEFIBRILLATOR PLACEMENT  2014    Subcutaneous AICD, [place]    NECK SURGERY      SHOULDER SURGERY       History reviewed. No pertinent family history. Social History     Tobacco Use    Smoking status: Former Smoker     Packs/day: 0.00     Types: Cigarettes    Smokeless tobacco: Never Used   Substance Use Topics    Alcohol use: Yes      Current Outpatient Medications   Medication Sig Dispense Refill    carvedilol (COREG) 25 MG tablet TAKE 1 TABLET BY MOUTH TWICE DAILY WITH MEALS 180 tablet 0    lisinopril (PRINIVIL;ZESTRIL) 5 MG tablet Take 1 tablet by mouth once daily 90 tablet 0    Multiple Vitamins-Minerals (CENTRUM SILVER ADULT 50+) TABS Take 1 tablet by mouth daily      Ferrous Sulfate (IRON PO) Take by mouth daily      DOCUSATE CALCIUM PO Take 250 mg by mouth daily      Flaxseed, Linseed, (FLAX SEED OIL PO) Take 1 tablet by mouth daily Indications: 1000mg soft gel capsule       aspirin 325 MG tablet 1/2 tablet daily        No current facility-administered medications for this visit.       Allergies: Bee venom and Nsaids    Review of Systems  Review of Systems Constitutional: Negative for activity change, diaphoresis, fatigue, fever and unexpected weight change. HENT: Negative for facial swelling, nosebleeds and trouble swallowing. Eyes: Negative for discharge, redness and visual disturbance. Respiratory: Negative for cough, shortness of breath and wheezing. Cardiovascular: Negative for chest pain, palpitations and leg swelling. Gastrointestinal: Negative for abdominal distention, abdominal pain, blood in stool, nausea and vomiting. Endocrine: Negative for cold intolerance and heat intolerance. Genitourinary: Negative for dysuria and hematuria. Musculoskeletal: Negative for gait problem. Skin: Negative for color change, pallor and rash. Neurological: Negative for dizziness, syncope, facial asymmetry and light-headedness. Hematological: Does not bruise/bleed easily. Psychiatric/Behavioral: Negative for behavioral problems and confusion. All other systems reviewed and are negative. Objective  Vital Signs - /88   Pulse 70   Ht 5' 7\" (1.702 m)   Wt 181 lb (82.1 kg)   SpO2 99%   BMI 28.35 kg/m²   Physical Exam  Vitals signs and nursing note reviewed. Constitutional:       Appearance: He is well-developed. HENT:      Head: Normocephalic and atraumatic. Right Ear: External ear normal.      Left Ear: External ear normal.      Nose: Nose normal.   Eyes:      General:         Right eye: No discharge. Left eye: No discharge. Pupils: Pupils are equal, round, and reactive to light. Neck:      Musculoskeletal: Normal range of motion. No edema. Vascular: No carotid bruit or JVD. Trachea: No tracheal deviation. Cardiovascular:      Rate and Rhythm: Normal rate and regular rhythm. Heart sounds: Murmur (1/6) present. No friction rub. No gallop. Pulmonary:      Effort: Pulmonary effort is normal. No respiratory distress. Breath sounds: No wheezing, rhonchi or rales.    Abdominal:      General: Bowel sounds are normal. There is no distension. Palpations: Abdomen is soft. Tenderness: There is no abdominal tenderness. Musculoskeletal: Normal range of motion. Skin:     General: Skin is warm and dry. Capillary Refill: Capillary refill takes less than 2 seconds. Findings: No rash. Neurological:      Mental Status: He is alert and oriented to person, place, and time. Psychiatric:         Behavior: Behavior normal.         Judgment: Judgment normal.         Cardiac data:    ECG 01/18/21  Sinus rhythm with nonspecific ST-T wave abnormalities, 64 bpm    QTc 0.437 ms    6/9/2014 cath Diffuse CAD, severe nonischemic cardiomyopathy    Assessment, Recommendations, & Plan:  78 y.o. male with      Diagnosis Orders   1. Coronary artery disease involving native coronary artery of native heart without angina pectoris     2. Nonischemic cardiomyopathy ([address]     3. AICD (automatic cardioverter/defibrillator) present     4. Mixed hyperlipidemia     5. Essential hypertension     6. Type 2 diabetes mellitus with complication, with long-term current use of insulin ([facility]         1. CAD-on aspirin, beta-blocker, ACE inhibitor. Stable on current medication therapy. 2.  Nonischemic cardiomyopathy-no recent 2D echo. Patient is asymptomatic and does not wish to undergo testing at this time. 3.  AICD-interrogated today by [employer]. Remaining battery life to [place] is 7%. Luzmaria Santana recommended seeing patient in 2 months to check battery status. Patient would like to stay in [place] for battery change. Luzmaria Santana says that Dr. Edilberto Zelaya at [facility] is able to change out the battery on subcutaneous placements. Will reach out today to see if we can get this scheduled for the patient. 4.  Hyperlipidemia-managed by PCP    5. Hypertension-blood pressure today 132/88    6. Diabetes-managed by PCP. No orders of the defined types were placed in this encounter.     Return in about 2 months (around 3/18/2021). Call with any questionsor concerns  Follow up with Bc Gallegos MD for non cardiac problems  Report any new problems  Cardiovascular Fitness-Exercise as tolerated. Strive for 15 minutes of exercise most days of the week. Cardiac / HealthyDiet  Continue current medications as directed  Continue plan of treatment  It is always recommended that you bring your medicationsbottles with you to each visit - this is for your safety! Please do not hesitate to contact me for any questions or concerns. Sincerely yours,    SMILEY Rene    This dictation was generated by voice recognition computer software. Although all attempts are made to edit dictation for accuracy, there may be errors in the transcription that are not intended.

## 2021-01-18 NOTE — PATIENT INSTRUCTIONS
No orders of the defined types were placed in this encounter. Return in about 2 months (around 3/18/2021). Call with any questionsor concerns  Follow up with Amelie Lay MD for non cardiac problems  Report any new problems  Cardiovascular Fitness-Exercise as tolerated. Strive for 15 minutes of exercise most days of the week. Cardiac / HealthyDiet  Continue current medications as directed  Continue plan of treatment  It is always recommended that you bring your medicationsbottles with you to each visit - this is for your safety!

## 2021-03-16 ENCOUNTER — TELEPHONE (OUTPATIENT)
Dept: CARDIOLOGY CLINIC | Age: 71
End: 2021-03-16

## 2021-04-12 ENCOUNTER — OFFICE VISIT (OUTPATIENT)
Dept: CARDIOLOGY CLINIC | Age: 71
End: 2021-04-12
Payer: MEDICARE

## 2021-04-12 VITALS
HEIGHT: 67 IN | HEART RATE: 82 BPM | BODY MASS INDEX: 27.62 KG/M2 | WEIGHT: 176 LBS | OXYGEN SATURATION: 98 % | SYSTOLIC BLOOD PRESSURE: 130 MMHG | DIASTOLIC BLOOD PRESSURE: 60 MMHG

## 2021-04-12 DIAGNOSIS — Z95.810 AICD (AUTOMATIC CARDIOVERTER/DEFIBRILLATOR) PRESENT: ICD-10-CM

## 2021-04-12 DIAGNOSIS — I10 ESSENTIAL HYPERTENSION: ICD-10-CM

## 2021-04-12 DIAGNOSIS — I25.10 CORONARY ARTERY DISEASE INVOLVING NATIVE CORONARY ARTERY OF NATIVE HEART WITHOUT ANGINA PECTORIS: Primary | ICD-10-CM

## 2021-04-12 DIAGNOSIS — E78.2 MIXED HYPERLIPIDEMIA: ICD-10-CM

## 2021-04-12 DIAGNOSIS — I50.22 CHRONIC SYSTOLIC CONGESTIVE HEART FAILURE (HCC): ICD-10-CM

## 2021-04-12 DIAGNOSIS — R09.89 BILATERAL CAROTID BRUITS: ICD-10-CM

## 2021-04-12 PROCEDURE — G8427 DOCREV CUR MEDS BY ELIG CLIN: HCPCS | Performed by: NURSE PRACTITIONER

## 2021-04-12 PROCEDURE — G8417 CALC BMI ABV UP PARAM F/U: HCPCS | Performed by: NURSE PRACTITIONER

## 2021-04-12 PROCEDURE — 3017F COLORECTAL CA SCREEN DOC REV: CPT | Performed by: NURSE PRACTITIONER

## 2021-04-12 PROCEDURE — 99214 OFFICE O/P EST MOD 30 MIN: CPT | Performed by: NURSE PRACTITIONER

## 2021-04-12 PROCEDURE — 1123F ACP DISCUSS/DSCN MKR DOCD: CPT | Performed by: NURSE PRACTITIONER

## 2021-04-12 PROCEDURE — 4040F PNEUMOC VAC/ADMIN/RCVD: CPT | Performed by: NURSE PRACTITIONER

## 2021-04-12 PROCEDURE — 93289 INTERROG DEVICE EVAL HEART: CPT | Performed by: NURSE PRACTITIONER

## 2021-04-12 PROCEDURE — 1036F TOBACCO NON-USER: CPT | Performed by: NURSE PRACTITIONER

## 2021-04-12 RX ORDER — LISINOPRIL 5 MG/1
TABLET ORAL
Qty: 90 TABLET | Refills: 3 | Status: SHIPPED | OUTPATIENT
Start: 2021-04-12 | End: 2021-04-19

## 2021-04-12 RX ORDER — CARVEDILOL 25 MG/1
TABLET ORAL
Qty: 180 TABLET | Refills: 3 | Status: SHIPPED | OUTPATIENT
Start: 2021-04-12 | End: 2022-05-24

## 2021-04-12 NOTE — PATIENT INSTRUCTIONS
New instructions for today:  Weigh yourself daily without clothing at the same time each day. Record a daily weight log. Call the office if you gain 3 pounds or more in 2 to 3 days or 5 pounds in one week. A sudden weight gain may mean that your heart failure is getting worse. Sodium causes your body to hold on to extra water. This may cause your heart failure symptoms to get worse. Limit sodium to 2,000 milligrams (mg) a day or less. That is less than 1 teaspoon of salt a day, including all the salt you eat in cooking or in packaged foods. Fluid restriction of 1500ml per day (about 6 cups of fluid per day). Patient Instructions:  Continue current medications as prescribed. Always keep a current medication list. Bring your medications to every office visit. Continue to follow up with primary care provider for non cardiac medical problems. Call the office with any problems, questions or concerns at [phone]. If you have been asked to keep a blood pressure log, do so for 2 weeks. Call the office to report readings to the triage nurse at [phone]. Follow up with cardiologist as scheduled. The following educational material has been included in this after visit summary for your review: Life simple 7. Heart health. Life simple 7  1) Manage blood pressure - high blood pressure is a major risk factor for heart disease and stroke. Keeping blood pressure in health range reduces strain on your heart, arteries and kidneys. Blood pressure goal is less than 130/80. 2) Control cholesterol - contributes to plaque, which can clog arteries and lead to heart disease and stroke. When you control your cholesterol you are giving your arteries their best chance to remain clear. It is recommended that you get cholesterol lab work done once a year. 3) Reduce blood sugar - most of the food we eat is turning into glucose or blood sugar that our body uses for energy.   Over time, high levels of blood sugar can damage your heart, kidneys, eyes and nerves. 4) Get active - living an active life is one of the most rewarding gifts you can give yourself and those you love. Simply put, daily physical activity increases your length and quality of life. Strive to exercise 15 minutes most days of the week. 5)  Eat better - A healthy diet is one of your best weapons for fighting cardiovascular disease. When you eat a heart healthy diet, you improve your chances for feeling good and staying healthy for life. 6)  Lose weight - when you shed extra fat an unnecessary pounds, you reduce the burden on your hear, lungs, blood vessels and skeleton. You give yourself the gift of active living, you lower your blood pressure and help yourself feel better. 7) Stop smoking - cigarette smokers have a higher risk of developing cardiovascular disease. If  You smoke, quitting is the best thing you can do for your health. Check American Heart Association on line for more information on Life's Simple 7 and tips for healthy living. A Healthy Heart: Care Instructions  Your Care Instructions     Coronary artery disease, also called heart disease, occurs when a substance called plaque builds up in the vessels that supply oxygen-rich blood to your heart muscle. This can narrow the blood vessels and reduce blood flow. A heart attack happens when blood flow is completely blocked. A high-fat diet, smoking, and other factors increase the risk of heart disease. Your doctor has found that you have a chance of having heart disease. You can do lots of things to keep your heart healthy. It may not be easy, but you can change your diet, exercise more, and quit smoking. These steps really work to lower your chance of heart disease. Follow-up care is a key part of your treatment and safety. Be sure to make and go to all appointments, and call your doctor if you are having problems.  It's also a good idea to know your test results and keep a through hospital main entrance and turn immediately to your left. Patient's contact number:  [phone] (home)     Date/Time:     Carotid Ultrasound   -  No prep. Takes approximately 30 minutes. Carotid ultrasound is a safe, painless procedure that uses sound waves to examine the structure and function of the carotid arteries in the neck. You have two carotid arteries, one on each side of the neck, which deliver blood from the heart to the brain. Carotid ultrasound can reveal whether an artery has any blockage and how well blood flows through the artery. Carotid ultrasound is usually used to screen for blockages that indicate an increased risk of stroke. Results from a carotid ultrasound can help your doctor determine what kind of treatment you may need to lower your risk. If you need to change your appointment, please call outpatient scheduling at [phone].

## 2021-04-12 NOTE — PROGRESS NOTES
[facility], [place]. [address] [facility]  [phone] office  [phone] fax      OFFICE VISIT:  4/12/2021    [address]: 1950  Reason For Visit:  King Anthony is a 78 y.o. male who is here for Follow-up (2 month follow up and battery check. ) and Coronary Artery Disease    History:   Diagnosis Orders   1. Coronary artery disease involving native coronary artery of native heart without angina pectoris     2. Chronic systolic congestive heart failure ([address]     3. Essential hypertension     4. Mixed hyperlipidemia     5. AICD (automatic cardioverter/defibrillator) present      Subcutaneous AICD- [employer]   6. Bilateral carotid bruits  VL DUP CAROTID BILATERAL     The patient presents today for cardiology follow up and subcutaneous [address] interrogation. Mr. Jackie Diop reports doing well with no edema or AICD discharges. The patient denies symptoms to suggest myocardial ischemia, heart failure or arrhythmia. BP is well controlled on current regimen. The patient's PCP monitors cholesterol. Subjective  Edwin denies exertional chest pain, shortness of breath, orthopnea, paroxysmal nocturnal dyspnea, syncope, presyncope, sensed arrhythmia, edema and fatigue. The patient denies numbness or weakness to suggest cerebrovascular accident or transient ischemic attack.     Naseem Leos has the following history as recorded in [facility]:  Patient Active Problem List   Diagnosis Code    Coronary artery disease involving native coronary artery of native heart without angina pectoris I25.10    Nonischemic cardiomyopathy ([address] I42.8    Mixed hyperlipidemia E78.2    Type 2 diabetes mellitus with complication, with long-term current use of insulin ([address] E11.8, Z79.4    AICD (automatic cardioverter/defibrillator) present Z95.810    Chronic systolic congestive heart failure ([address] I50.22    Essential hypertension I10     Past Medical History:   Diagnosis Date    AICD (automatic cardioverter/defibrillator) present 10/4/2017    Cardiac defibrillator in place     Chronic systolic congestive heart failure ([address] 6/1/2018    Coronary artery disease involving native coronary artery of native heart without angina pectoris 10/4/2017    Hypertension     Mixed hyperlipidemia 10/4/2017    Myocardial infarction ([address]     Nonischemic cardiomyopathy [facility] 10/4/2017    Type 2 diabetes mellitus with complication, with long-term current use of insulin ([facility] 10/4/2017     Past Surgical History:   Procedure Laterality Date    CARDIAC CATHETERIZATION  6/9/14  JDT    diffuse CAD. EF 5%    CARDIAC DEFIBRILLATOR PLACEMENT  2014    Subcutaneous AICD, [place]    NECK SURGERY      SHOULDER SURGERY       History reviewed. No pertinent family history. Social History     Tobacco Use    Smoking status: Former Smoker     Packs/day: 0.00     Types: Cigarettes    Smokeless tobacco: Never Used   Substance Use Topics    Alcohol use: Yes      Current Outpatient Medications   Medication Sig Dispense Refill    carvedilol (COREG) 25 MG tablet TAKE 1 TABLET BY MOUTH TWICE DAILY WITH MEALS 180 tablet 0    lisinopril (PRINIVIL;ZESTRIL) 5 MG tablet Take 1 tablet by mouth once daily 90 tablet 0    Multiple Vitamins-Minerals (CENTRUM SILVER ADULT 50+) TABS Take 1 tablet by mouth daily      Ferrous Sulfate (IRON PO) Take by mouth daily      DOCUSATE CALCIUM PO Take 250 mg by mouth daily      Flaxseed, Linseed, (FLAX SEED OIL PO) Take 1 tablet by mouth daily Indications: 1000mg soft gel capsule       aspirin 325 MG tablet 1/2 tablet daily        No current facility-administered medications for this visit. Allergies: Bee venom and Nsaids    Review of Systems  Constitutional - no appetite change, or unexpected weight change. No fever, chills or diaphoresis.   No significant change in activity level or new onset of fatigue. HEENT - no significant rhinorrhea or epistaxis. No tinnitus or significant hearing loss. Eyes - no sudden vision change or amaurosis. No corneal arcus, xantholasma, subconjunctival hemorrhage or discharge. Respiratory - no significant wheezing, stridor, apnea or cough. No dyspnea on exertion or shortness of air. Cardiovascular - no exertional chest pain to suggest myocardial ischemia. No orthopnea or PND. No sensation of sustained arrythmia. No occurrence of slow heart rate. No palpitations. No claudication. Gastrointestinal - no abdominal swelling or pain. No blood in stool. No severe constipation, diarrhea, nausea, or vomiting. Genitourinary - no dysuria, frequency, or urgency. No flank pain or hematuria. Musculoskeletal - no back pain or myalgia. No problems with gait. Extremities - no clubbing, cyanosis or extremity edema. Skin - no color change or rash. No pallor. No new surgical incision. Neurologic - no speech difficulty, facial asymmetry or lateralizing weakness. No seizures, presyncope or syncope. No significant dizziness. Hematologic - no easy bruising or excessive bleeding. Psychiatric - no severe anxiety or insomnia. No confusion. All other review of systems are negative. Objective  Vital Signs - /60   Pulse 82   Ht 5' 7\" (1.702 m)   Wt 176 lb (79.8 kg)   SpO2 98%   BMI 27.57 kg/m²   General - Edwin is alert, cooperative, and pleasant. Well groomed. No acute distress. Body habitus - Body mass index is 27.57 kg/m². HEENT - Head is normocephalic. No circumoral cyanosis. Dentition is normal.  EYES -   Lids normal without ptosis. No discharge, edema or subconjunctival hemorrhage. Neck - Symmetrical without apparent mass or lymphadenopathy. Respiratory - Normal respiratory effort without use of accessory muscles. Ausculatation reveals vesicular breath sounds without crackles, wheezes, rub or rhonchi.     Cardiovascular - No jugular venous distention. Auscultation reveals regular rate and rhythm. No audible clicks, gallop or rub. No murmur. No lower extremity varicosities. No carotid bruits. Abdominal -  No visible distention, mass or pulsations. Extremities - No clubbing or cyanosis. No statis dermatitis or ulcers. No edema. Musculoskeletal -   No Osler's nodes. No kyphosis or scoliosis. Gait is even and regular without limp or shuffle. Ambulates without assistance. Skin -  Warm and dry; no rash or pallor. No new surgical wound. Neurological - No focal neurological deficits. Thought processes coherent. No apparent tremor. Oriented to person, place and time. Psychiatric -  Appropriate affect and mood. Data reviewed:  Lab Results   Component Value Date     06/10/2014    K 4.1 06/10/2014    CL 96 (L) 06/10/2014    CO2 29 06/10/2014    BUN 20 06/10/2014    CREATININE 1.3 (H) 06/10/2014    GLUCOSE 134 06/10/2014    CALCIUM 8.6 06/10/2014    PROT 6.3 (L) 06/09/2014    LABALBU 3.6 06/09/2014    ALKPHOS 82 06/09/2014    AST 20 06/09/2014    ALT 21 06/09/2014    LABGLOM 60 06/10/2014       Lab Results   Component Value Date    WBC 7.62 06/11/2014    HGB 12.2 (L) 06/11/2014    HCT 36.8 (L) 06/11/2014    MCV 93.2 06/11/2014     06/11/2014     Lab Results   Component Value Date    CHOL 172 06/09/2014     Lab Results   Component Value Date    TRIG 96 06/09/2014     Lab Results   Component Value Date    HDL 29 06/09/2014     Lab Results   Component Value Date    LDLDIRECT 123 06/09/2014     BP Readings from Last 3 Encounters:   04/12/21 130/60   01/18/21 132/88   10/12/20 126/74    Pulse Readings from Last 3 Encounters:   04/12/21 82   01/18/21 70   10/12/20 64        Wt Readings from Last 3 Encounters:   04/12/21 176 lb (79.8 kg)   01/18/21 181 lb (82.1 kg)   10/12/20 178 lb (80.7 kg)     Assessment/Plan:   Diagnosis Orders   1.  Coronary artery disease involving native coronary artery of native heart without angina pectoris     2. Chronic systolic congestive heart failure ([address]     3. Essential hypertension     4. Mixed hyperlipidemia     5. AICD (automatic cardioverter/defibrillator) present       AICD check:  ICD interrogation showed battery status now at 5%. Mode:  Single lead subcutaneous AICD  Lead and defib impedances stable. Reprogramming for sensitivity and threshold testing. Normal diagnostics and safety margins noted. No ICD discharges. Monitored arrhythmia: none  Sustained arrhythmia:  none  Battery check - 2 months. Stable CV status without overt heart failure, sensed arrhythmia or angina. CAD - stable on current medical management. Chronic systolic heart failure - NYHA class II stage C  GDMT includes Coreg and Lisinopril. Weight is down 6 pounds - currently euvolemic. No arrhythmia noted on AICD check. HTN - normotensive on current regimen. Hyperlipidemia - not currently on statin. . Patient is compliant with medication regimen. Previous cardiac history and records reviewed. Continue current medical management for cardiac related condition. Continue other current medications as directed. Continue to follow up with primary care provider for non cardiac medical problems. If your primary care provider is outside of the [facility], please request that your labs be faxed to this office at [phone]. BP goal 130/80 or less. Call the office with any problems, questions or concerns at [phone]. Cardiology follow up as scheduled in [address] appointments. Educational included in patient instructions. Heart health.       Dorita Eisenmenger, SMILEY

## 2021-04-19 RX ORDER — LISINOPRIL 5 MG/1
TABLET ORAL
Qty: 90 TABLET | Refills: 3 | Status: SHIPPED | OUTPATIENT
Start: 2021-04-19 | End: 2022-03-28 | Stop reason: SDUPTHER

## 2021-06-02 ENCOUNTER — TELEPHONE (OUTPATIENT)
Dept: CARDIOLOGY CLINIC | Age: 71
End: 2021-06-02

## 2021-06-14 ENCOUNTER — OFFICE VISIT (OUTPATIENT)
Dept: CARDIOLOGY CLINIC | Age: 71
End: 2021-06-14
Payer: MEDICARE

## 2021-06-14 VITALS
SYSTOLIC BLOOD PRESSURE: 136 MMHG | HEART RATE: 69 BPM | BODY MASS INDEX: 26.84 KG/M2 | WEIGHT: 171 LBS | DIASTOLIC BLOOD PRESSURE: 84 MMHG | HEIGHT: 67 IN | OXYGEN SATURATION: 99 %

## 2021-06-14 DIAGNOSIS — Z95.810 AICD (AUTOMATIC CARDIOVERTER/DEFIBRILLATOR) PRESENT: ICD-10-CM

## 2021-06-14 DIAGNOSIS — I42.8 NONISCHEMIC CARDIOMYOPATHY (HCC): ICD-10-CM

## 2021-06-14 DIAGNOSIS — I10 ESSENTIAL HYPERTENSION: ICD-10-CM

## 2021-06-14 DIAGNOSIS — I25.10 CORONARY ARTERY DISEASE INVOLVING NATIVE CORONARY ARTERY OF NATIVE HEART WITHOUT ANGINA PECTORIS: Primary | ICD-10-CM

## 2021-06-14 PROCEDURE — 3017F COLORECTAL CA SCREEN DOC REV: CPT | Performed by: NURSE PRACTITIONER

## 2021-06-14 PROCEDURE — G8427 DOCREV CUR MEDS BY ELIG CLIN: HCPCS | Performed by: NURSE PRACTITIONER

## 2021-06-14 PROCEDURE — 4040F PNEUMOC VAC/ADMIN/RCVD: CPT | Performed by: NURSE PRACTITIONER

## 2021-06-14 PROCEDURE — 1036F TOBACCO NON-USER: CPT | Performed by: NURSE PRACTITIONER

## 2021-06-14 PROCEDURE — 99213 OFFICE O/P EST LOW 20 MIN: CPT | Performed by: NURSE PRACTITIONER

## 2021-06-14 PROCEDURE — 1123F ACP DISCUSS/DSCN MKR DOCD: CPT | Performed by: NURSE PRACTITIONER

## 2021-06-14 PROCEDURE — G8417 CALC BMI ABV UP PARAM F/U: HCPCS | Performed by: NURSE PRACTITIONER

## 2021-06-14 ASSESSMENT — ENCOUNTER SYMPTOMS
WHEEZING: 0
EYE DISCHARGE: 0
COLOR CHANGE: 0
VOMITING: 0
COUGH: 0
ABDOMINAL PAIN: 0
BLOOD IN STOOL: 0
NAUSEA: 0
TROUBLE SWALLOWING: 0
EYE REDNESS: 0
SHORTNESS OF BREATH: 0
FACIAL SWELLING: 0
ABDOMINAL DISTENTION: 0

## 2021-06-14 NOTE — PROGRESS NOTES
[address] [place] [address] Cardiology  [address]  [address]  Phone: [phone]  Fax: [phone]    OFFICE VISIT:  6/14/2021    [address] [place] [address] Street: 1950    Reason For Visit:  Kamaljit Chapman is a 78 y.o. male who is here for Follow-up (Patient denies any cardiac complaints. He does state his blood pressure was up last night. ) and Coronary Artery Disease      HPI    Mr. Rossy Allen is a 78 y.o. male with history of coronary artery disease, hypertension, hyperlipidemia, diabetes, former nicotine dependence, and ischemic cardiomyopathy with AICD in place who presents with the chief complaint of 2-month follow-up. Patient states since previous appointment he has been doing well. He did have some high blood pressure last night after eating fried chicken. This is improved today. He remains active in the fields without difficulties. He reports no cardiac symptoms. His AICD is nearing end-of-life on battery. Nettie Thompson with [employer] was here today to interrogate. She states this is subcutaneous device and no one at [address] [place] [address] is able to change out the battery. Checked with Dr. Gail Ibrahim at [facility] and he will be willing to change device. Kamaljit Chapman has no exertional chest pain, pressure, burning, tightness or squeezing. No symptomatic tachy- or didi-arrhythmia. No lightheadedness, dizziness, or syncope. No numbness or weakness to suggest cerebrovascular accident or transient ischemic attack. he denies signs of bleeding. Reports no edema or shortness of breath. He denies orthopnea or paroxysmal nocturnal dyspnea. he is sleeping on 1 pillow at night. he has been compliant with his medications. his BP has been controlled at home. he reports no activity change. PCP follows lipids and labs. Misty Jacobsen MD is PCP.   Julian Thorne has the following history as [facility] in [facility]:    Patient Active Problem List    Diagnosis Date Noted    Essential hypertension 01/18/2021    Chronic systolic congestive heart failure ([facility] 06/01/2018    Coronary artery disease involving native coronary artery of native heart without angina pectoris 10/04/2017    Nonischemic cardiomyopathy ([facility] 10/04/2017    Mixed hyperlipidemia 10/04/2017    Type 2 diabetes mellitus with complication, with long-term current use of insulin ([facility] 10/04/2017    AICD (automatic cardioverter/defibrillator) present 10/04/2017     Past Medical History:   Diagnosis Date    AICD (automatic cardioverter/defibrillator) present 10/4/2017    Cardiac defibrillator in place     Chronic systolic congestive heart failure ([facility] 6/1/2018    Coronary artery disease involving native coronary artery of native heart without angina pectoris 10/4/2017    Hypertension     Mixed hyperlipidemia 10/4/2017    Myocardial infarction ([facility]     Nonischemic cardiomyopathy ([facility] 10/4/2017    Type 2 diabetes mellitus with complication, with long-term current use of insulin [facility] 10/4/2017     Past Surgical History:   Procedure Laterality Date    CARDIAC CATHETERIZATION  6/9/14  JDT    diffuse CAD. EF 5%    CARDIAC DEFIBRILLATOR PLACEMENT  2014    Subcutaneous AICD, [place]    NECK SURGERY      SHOULDER SURGERY       History reviewed. No pertinent family history.   Social History     Tobacco Use    Smoking status: Former Smoker     Packs/day: 0.00     Types: Cigarettes    Smokeless tobacco: Never Used   Substance Use Topics    Alcohol use: Yes      Current Outpatient Medications   Medication Sig Dispense Refill    lisinopril (PRINIVIL;ZESTRIL) 5 MG tablet Take 1 tablet by mouth once daily 90 tablet 3    carvedilol (COREG) 25 MG tablet TAKE 1 TABLET BY MOUTH TWICE DAILY WITH MEALS 180 tablet 3    Multiple Vitamins-Minerals (CENTRUM SILVER ADULT 50+) TABS Take 1 tablet by mouth daily      Ferrous Sulfate (IRON PO) Take by mouth daily      DOCUSATE CALCIUM PO Take 250 mg by mouth daily      Flaxseed, Linseed, (FLAX SEED OIL PO) Take 1 tablet by mouth daily Indications: 1000mg soft gel capsule       aspirin 325 MG tablet 1/2 tablet daily        No current facility-administered medications for this visit. Allergies: Bee venom and Nsaids    Review of Systems  Review of Systems   Constitutional: Negative for activity change, diaphoresis, fatigue, fever and unexpected weight change. HENT: Negative for facial swelling, nosebleeds and trouble swallowing. Eyes: Negative for discharge, redness and visual disturbance. Respiratory: Negative for cough, shortness of breath and wheezing. Cardiovascular: Negative for chest pain, palpitations and leg swelling. Gastrointestinal: Negative for abdominal distention, abdominal pain, blood in stool, nausea and vomiting. Endocrine: Negative for cold intolerance and heat intolerance. Genitourinary: Negative for dysuria and hematuria. Musculoskeletal: Negative for gait problem. Skin: Negative for color change, pallor and rash. Neurological: Negative for dizziness, syncope, facial asymmetry and light-headedness. Hematological: Does not bruise/bleed easily. Psychiatric/Behavioral: Negative for behavioral problems and confusion. All other systems reviewed and are negative. Objective  Vital Signs - /84   Pulse 69   Ht 5' 7\" (1.702 m)   Wt 171 lb (77.6 kg)   SpO2 99%   BMI 26.78 kg/m²   Physical Exam  Vitals and nursing note reviewed. Constitutional:       Appearance: He is well-developed. HENT:      Head: Normocephalic and atraumatic. Right Ear: External ear normal.      Left Ear: External ear normal.      Nose: Nose normal.   Eyes:      General:         Right eye: No discharge. Left eye: No discharge. Pupils: Pupils are equal, round, and reactive to light. Neck:      Vascular: Carotid bruit present. No JVD. Trachea: No tracheal deviation. Cardiovascular:      Rate and Rhythm: Normal rate and regular rhythm. Heart sounds: Murmur (1/6) heard.    No orders of the defined types were placed in this encounter. Return in about 4 weeks (around 7/12/2021) for 1 month for battery check on device, [employer]. Call with any questionsor concerns  Follow up with Khalif Sheppard MD for non cardiac problems  Report any new problems  Cardiovascular Fitness-Exercise as tolerated. Strive for 15 minutes of exercise most days of the week. Cardiac / HealthyDiet  Continue current medications as directed  Continue plan of treatment  It is always recommended that you bring your medicationsbottles with you to each visit - this is for your safety! Please do not hesitate to contact me for any questions or concerns. Sincerely yours,    SMILEY Baldwin    This dictation was generated by voice recognition computer software. Although all attempts are made to edit dictation for accuracy, there may be errors in the transcription that are not intended.

## 2021-08-02 ENCOUNTER — OFFICE VISIT (OUTPATIENT)
Dept: CARDIOLOGY CLINIC | Age: 71
End: 2021-08-02
Payer: MEDICARE

## 2021-08-02 VITALS
OXYGEN SATURATION: 99 % | WEIGHT: 167 LBS | HEART RATE: 79 BPM | HEIGHT: 67 IN | BODY MASS INDEX: 26.21 KG/M2 | SYSTOLIC BLOOD PRESSURE: 134 MMHG | DIASTOLIC BLOOD PRESSURE: 84 MMHG

## 2021-08-02 DIAGNOSIS — I25.10 CORONARY ARTERY DISEASE INVOLVING NATIVE CORONARY ARTERY OF NATIVE HEART WITHOUT ANGINA PECTORIS: ICD-10-CM

## 2021-08-02 DIAGNOSIS — I65.23 BILATERAL CAROTID ARTERY STENOSIS: ICD-10-CM

## 2021-08-02 DIAGNOSIS — I10 ESSENTIAL HYPERTENSION: ICD-10-CM

## 2021-08-02 DIAGNOSIS — Z95.810 AICD (AUTOMATIC CARDIOVERTER/DEFIBRILLATOR) PRESENT: Primary | ICD-10-CM

## 2021-08-02 DIAGNOSIS — I42.8 NONISCHEMIC CARDIOMYOPATHY (HCC): ICD-10-CM

## 2021-08-02 PROCEDURE — 1123F ACP DISCUSS/DSCN MKR DOCD: CPT | Performed by: NURSE PRACTITIONER

## 2021-08-02 PROCEDURE — 1036F TOBACCO NON-USER: CPT | Performed by: NURSE PRACTITIONER

## 2021-08-02 PROCEDURE — G8427 DOCREV CUR MEDS BY ELIG CLIN: HCPCS | Performed by: NURSE PRACTITIONER

## 2021-08-02 PROCEDURE — 3017F COLORECTAL CA SCREEN DOC REV: CPT | Performed by: NURSE PRACTITIONER

## 2021-08-02 PROCEDURE — 4040F PNEUMOC VAC/ADMIN/RCVD: CPT | Performed by: NURSE PRACTITIONER

## 2021-08-02 PROCEDURE — G8417 CALC BMI ABV UP PARAM F/U: HCPCS | Performed by: NURSE PRACTITIONER

## 2021-08-02 PROCEDURE — 99213 OFFICE O/P EST LOW 20 MIN: CPT | Performed by: NURSE PRACTITIONER

## 2021-08-02 ASSESSMENT — ENCOUNTER SYMPTOMS
COUGH: 0
ABDOMINAL DISTENTION: 0
NAUSEA: 0
EYE REDNESS: 0
ABDOMINAL PAIN: 0
BLOOD IN STOOL: 0
SHORTNESS OF BREATH: 0
COLOR CHANGE: 0
TROUBLE SWALLOWING: 0
VOMITING: 0
FACIAL SWELLING: 0
EYE DISCHARGE: 0
WHEEZING: 0

## 2021-08-02 NOTE — PROGRESS NOTES
[address] [place] [address] Cardiology  [address]  [id number]  Phone: [phone]  Fax: [phone]    OFFICE VISIT:  8/2/2021    [address] [place] [address] Street: 1950    Reason For Visit:  Janet Sheth is a 78 y.o. male who is here for 1 Month Follow-Up (Patient denies any cardiac symptoms. ) and Coronary Artery Disease      HPI    Mr. Marilee Hoover is a 78 y.o. male with history of coronary artery disease, hypertension, hyperlipidemia, diabetes, former nicotine dependence, and ischemic cardiomyopathy with AICD in place who presents with the chief complaint of 2-month follow-up. His AICD is at end-of-life on battery. Patient's device is a subcutaneous device and [facility] has previously checked with Dr. Zackary Bassett at [facility] who is willing to change the device. Patient would like to stay in [place] if possible. Janet Sheth has no exertional chest pain, pressure, burning, tightness or squeezing. No symptomatic tachy- or didi-arrhythmia. No lightheadedness, dizziness, or syncope. No numbness or weakness to suggest cerebrovascular accident or transient ischemic attack. he denies signs of bleeding. Reports no edema or shortness of breath. He denies orthopnea or paroxysmal nocturnal dyspnea. he is sleeping on 1 pillow at night. he has been compliant with his medications. his BP has been controlled at home. he reports no activity change. PCP follows lipids and labs. Saranya Ferreira MD is PCP.   Vane Finney has the following history as [facility] in [facility]:    Patient Active Problem List    Diagnosis Date Noted    Essential hypertension 01/18/2021    Chronic systolic congestive heart failure ([address] 06/01/2018    Coronary artery disease involving native coronary artery of native heart without angina pectoris 10/04/2017    Nonischemic cardiomyopathy ([address] 10/04/2017    Mixed hyperlipidemia 10/04/2017    Type 2 diabetes mellitus with complication, with long-term current use of insulin ([address] 10/04/2017    AICD (automatic cardioverter/defibrillator) present 10/04/2017     Past Medical History:   Diagnosis Date    AICD (automatic cardioverter/defibrillator) present 10/4/2017    Cardiac defibrillator in place     Chronic systolic congestive heart failure ([facility] 6/1/2018    Coronary artery disease involving native coronary artery of native heart without angina pectoris 10/4/2017    Hypertension     Mixed hyperlipidemia 10/4/2017    Myocardial infarction ([address]     Nonischemic cardiomyopathy ([facility] 10/4/2017    Type 2 diabetes mellitus with complication, with long-term current use of insulin ([facility] 10/4/2017     Past Surgical History:   Procedure Laterality Date    CARDIAC CATHETERIZATION  6/9/14  JDT    diffuse CAD. EF 5%    CARDIAC DEFIBRILLATOR PLACEMENT  2014    Subcutaneous AICD, [place]    NECK SURGERY      SHOULDER SURGERY       History reviewed. No pertinent family history. Social History     Tobacco Use    Smoking status: Former Smoker     Packs/day: 0.00     Types: Cigarettes    Smokeless tobacco: Never Used   Substance Use Topics    Alcohol use: Yes      Current Outpatient Medications   Medication Sig Dispense Refill    lisinopril (PRINIVIL;ZESTRIL) 5 MG tablet Take 1 tablet by mouth once daily 90 tablet 3    carvedilol (COREG) 25 MG tablet TAKE 1 TABLET BY MOUTH TWICE DAILY WITH MEALS 180 tablet 3    Multiple Vitamins-Minerals (CENTRUM SILVER ADULT 50+) TABS Take 1 tablet by mouth daily      Ferrous Sulfate (IRON PO) Take by mouth daily      DOCUSATE CALCIUM PO Take 250 mg by mouth daily      Flaxseed, Linseed, (FLAX SEED OIL PO) Take 1 tablet by mouth daily Indications: 1000mg soft gel capsule       aspirin 325 MG tablet 1/2 tablet daily        No current facility-administered medications for this visit.      Allergies: Bee venom and Nsaids    Review of Systems  Review of Systems   Constitutional: Negative for activity change, diaphoresis, fatigue, fever and unexpected weight change. HENT: Negative for facial swelling, nosebleeds and trouble swallowing. Eyes: Negative for discharge, redness and visual disturbance. Respiratory: Negative for cough, shortness of breath and wheezing. Cardiovascular: Negative for chest pain, palpitations and leg swelling. Gastrointestinal: Negative for abdominal distention, abdominal pain, blood in stool, nausea and vomiting. Endocrine: Negative for cold intolerance and heat intolerance. Genitourinary: Negative for dysuria and hematuria. Musculoskeletal: Negative for gait problem. Skin: Negative for color change, pallor and rash. Neurological: Negative for dizziness, syncope, facial asymmetry and light-headedness. Hematological: Does not bruise/bleed easily. Psychiatric/Behavioral: Negative for behavioral problems and confusion. All other systems reviewed and are negative. Objective  Vital Signs - /84   Pulse 79   Ht 5' 7\" (1.702 m)   Wt 167 lb (75.8 kg)   SpO2 99%   BMI 26.16 kg/m²   Physical Exam  Vitals and nursing note reviewed. Constitutional:       Appearance: He is well-developed. HENT:      Head: Normocephalic and atraumatic. Right Ear: External ear normal.      Left Ear: External ear normal.      Nose: Nose normal.   Eyes:      General:         Right eye: No discharge. Left eye: No discharge. Pupils: Pupils are equal, round, and reactive to light. Neck:      Vascular: Carotid bruit present. No JVD. Trachea: No tracheal deviation. Cardiovascular:      Rate and Rhythm: Normal rate and regular rhythm. Heart sounds: Murmur (1/6) heard. No friction rub. No gallop. Pulmonary:      Effort: Pulmonary effort is normal. No respiratory distress. Breath sounds: No wheezing, rhonchi or rales. Abdominal:      General: Bowel sounds are normal. There is no distension. Palpations: Abdomen is soft. Tenderness: There is no abdominal tenderness. Musculoskeletal:         General: Normal range of motion. Cervical back: Normal range of motion. No edema. Skin:     General: Skin is warm and dry. Capillary Refill: Capillary refill takes less than 2 seconds. Findings: No rash. Neurological:      Mental Status: He is alert and oriented to person, place, and time. Psychiatric:         Behavior: Behavior normal.         Judgment: Judgment normal.         Cardiac data:    ECG 08/02/21  Sinus rhythm with nonspecific ST-T wave abnormalities, 64 bpm    QTc 0.437 ms    6/9/2014 cath Diffuse CAD, severe nonischemic cardiomyopathy    Assessment, Recommendations, & Plan:  78 y.o. male with      Diagnosis Orders   1. AICD (automatic cardioverter/defibrillator) present     2. Coronary artery disease involving native coronary artery of native heart without angina pectoris     3. Nonischemic cardiomyopathy ([address]     4. Essential hypertension     5. Bilateral carotid artery stenosis  External Referral To Vascular Surgery       1. AICD-interrogated [facility] at [facility] on 7/2/2021. No shocks. We will set up for battery to be changed at [facility] with Dr. Tien Bosch. 2. CAD-on aspirin, beta-blocker, ACE inhibitor. Stable on current medication therapy. 3.  Nonischemic cardiomyopathy-no recent 2D echo. Patient is asymptomatic and does not wish to undergo testing at this time. 4.  Hypertension-blood pressure today 134/84    5. Bilateral carotid artery stenosis-carotid ultrasound report from [facility] shows: There is elevation of the ICA peak systolic velocities and ICA/CCA ratios left greater than right most consistent with 80 to 99% stenosis. Recommend CTA imaging and vascular surgical consultation and follow-up. We will refer patient to Dr. Suki Ricks in [facility] for further evaluation and treatment.          Orders Placed This Encounter   Procedures    External Referral To Vascular Surgery     Referral Priority:   Routine     Referral Type:   Eval and Treat     Referral Reason:   Specialty Services Required     Requested Specialty:   Vascular Surgery     Number of Visits Requested:   1     Return in about 6 weeks (around 9/13/2021). Call with any questionsor concerns  Follow up with Tamar Cespedes MD for non cardiac problems  Report any new problems  Cardiovascular Fitness-Exercise as tolerated. Strive for 15 minutes of exercise most days of the week. Cardiac / HealthyDiet  Continue current medications as directed  Continue plan of treatment  It is always recommended that you bring your medicationsbottles with you to each visit - this is for your safety! Please do not hesitate to contact me for any questions or concerns. Sincerely yours,    SMILEY Eason    This dictation was generated by voice recognition computer software. Although all attempts are made to edit dictation for accuracy, there may be errors in the transcription that are not intended.

## 2021-08-16 ENCOUNTER — PREP FOR SURGERY (OUTPATIENT)
Dept: OTHER | Facility: HOSPITAL | Age: 71
End: 2021-08-16

## 2021-08-16 DIAGNOSIS — Z45.02 AICD AT END OF BATTERY LIFE: Primary | ICD-10-CM

## 2021-08-16 RX ORDER — SODIUM CHLORIDE 0.9 % (FLUSH) 0.9 %
10 SYRINGE (ML) INJECTION AS NEEDED
Status: CANCELLED | OUTPATIENT
Start: 2021-08-16

## 2021-08-16 RX ORDER — VANCOMYCIN HYDROCHLORIDE 1 G/200ML
1000 INJECTION, SOLUTION INTRAVENOUS ONCE
Status: CANCELLED | OUTPATIENT
Start: 2021-08-16 | End: 2021-08-16

## 2021-08-16 RX ORDER — ONDANSETRON 2 MG/ML
4 INJECTION INTRAMUSCULAR; INTRAVENOUS EVERY 6 HOURS PRN
Status: CANCELLED | OUTPATIENT
Start: 2021-08-16

## 2021-08-16 RX ORDER — SODIUM CHLORIDE 0.9 % (FLUSH) 0.9 %
3 SYRINGE (ML) INJECTION EVERY 12 HOURS SCHEDULED
Status: CANCELLED | OUTPATIENT
Start: 2021-08-16

## 2021-08-26 ENCOUNTER — HOSPITAL ENCOUNTER (OUTPATIENT)
Facility: HOSPITAL | Age: 71
Discharge: HOME | End: 2021-08-27
Attending: INTERNAL MEDICINE | Admitting: INTERNAL MEDICINE

## 2021-08-26 DIAGNOSIS — Z45.02 AICD AT END OF BATTERY LIFE: ICD-10-CM

## 2021-08-26 LAB
DEPRECATED RDW RBC AUTO: 41.6 FL (ref 37–54)
ERYTHROCYTE [DISTWIDTH] IN BLOOD BY AUTOMATED COUNT: 12.5 % (ref 12.3–15.4)
HCT VFR BLD AUTO: 36.8 % (ref 37.5–51)
HGB BLD-MCNC: 12.9 G/DL (ref 13–17.7)
MCH RBC QN AUTO: 31.9 PG (ref 26.6–33)
MCHC RBC AUTO-ENTMCNC: 35.1 G/DL (ref 31.5–35.7)
MCV RBC AUTO: 90.9 FL (ref 79–97)
PLATELET # BLD AUTO: 150 10*3/MM3 (ref 140–450)
PMV BLD AUTO: 10 FL (ref 6–12)
RBC # BLD AUTO: 4.05 10*6/MM3 (ref 4.14–5.8)
SARS-COV-2 RNA PNL SPEC NAA+PROBE: NOT DETECTED
WBC # BLD AUTO: 5.7 10*3/MM3 (ref 3.4–10.8)

## 2021-08-26 PROCEDURE — G0378 HOSPITAL OBSERVATION PER HR: HCPCS

## 2021-08-26 PROCEDURE — 63710000001 CEPHALEXIN 500 MG CAPSULE: Performed by: INTERNAL MEDICINE

## 2021-08-26 PROCEDURE — C9803 HOPD COVID-19 SPEC COLLECT: HCPCS

## 2021-08-26 PROCEDURE — 99152 MOD SED SAME PHYS/QHP 5/>YRS: CPT | Performed by: INTERNAL MEDICINE

## 2021-08-26 PROCEDURE — 85027 COMPLETE CBC AUTOMATED: CPT | Performed by: INTERNAL MEDICINE

## 2021-08-26 PROCEDURE — 99153 MOD SED SAME PHYS/QHP EA: CPT | Performed by: INTERNAL MEDICINE

## 2021-08-26 PROCEDURE — A9270 NON-COVERED ITEM OR SERVICE: HCPCS | Performed by: INTERNAL MEDICINE

## 2021-08-26 PROCEDURE — 33262 RMVL& REPLC PULSE GEN 1 LEAD: CPT | Performed by: INTERNAL MEDICINE

## 2021-08-26 PROCEDURE — 25010000002 MIDAZOLAM HCL (PF) 5 MG/5ML SOLUTION: Performed by: INTERNAL MEDICINE

## 2021-08-26 PROCEDURE — C1889 IMPLANT/INSERT DEVICE, NOC: HCPCS | Performed by: INTERNAL MEDICINE

## 2021-08-26 PROCEDURE — 99235 HOSP IP/OBS SAME DATE MOD 70: CPT | Performed by: INTERNAL MEDICINE

## 2021-08-26 PROCEDURE — 63710000001 CARVEDILOL 25 MG TABLET: Performed by: INTERNAL MEDICINE

## 2021-08-26 PROCEDURE — 25010000002 FENTANYL CITRATE (PF) 100 MCG/2ML SOLUTION: Performed by: INTERNAL MEDICINE

## 2021-08-26 PROCEDURE — 25010000002 VANCOMYCIN PER 500 MG: Performed by: INTERNAL MEDICINE

## 2021-08-26 PROCEDURE — C1722 AICD, SINGLE CHAMBER: HCPCS | Performed by: INTERNAL MEDICINE

## 2021-08-26 PROCEDURE — 87635 SARS-COV-2 COVID-19 AMP PRB: CPT | Performed by: INTERNAL MEDICINE

## 2021-08-26 DEVICE — SUBCUTANEOUS IMPLANTABLE CARDIOVERTER DEFIBRILLATOR
Type: IMPLANTABLE DEVICE | Site: CHEST WALL | Status: FUNCTIONAL
Brand: EMBLEM™ MRI S-ICD

## 2021-08-26 DEVICE — SEAL HEMO SURG ARISTA/AH ABS/PWDR 1GM: Type: IMPLANTABLE DEVICE | Site: CHEST WALL | Status: FUNCTIONAL

## 2021-08-26 DEVICE — THE CANGAROO® ENVELOPE IS INTENDED TO SECURELY HOLD A CARDIAC IMPLANTABLE ELECTRONIC DEVICE OR AN IMPLANTABLE NEUROSTIMULATOR TO CREATE A STABLE ENVIRONMENT WHEN IMPLANTED IN THE BODY. THE CARDIAC IMPLANTABLE ELECTRONIC DEVICES THAT MAY BE USED WITH THE CANGAROO® ENVELOPE INCLUDE PACEMAKER PULSE GENERATORS, DEFIBRILLATORS, OR OTHER CARDIAC IMPLANTABLE ELECTRONIC DEVICES. THE IMPLANTABLE NEUROSTIMULATOR DEVICES THAT MAY BE USED WITH THE CANGAROO® ENVELOPE INCLUDE VAGUS NERVE STIMULATORS, SPINAL CORDNEUROMODULATORS, DEEP BRAIN STIMULATORS AND SACRAL NERVE STIMULATORS.
Type: IMPLANTABLE DEVICE | Site: CHEST WALL | Status: FUNCTIONAL
Brand: CANGAROO ENVELOPE

## 2021-08-26 RX ORDER — BUPIVACAINE HYDROCHLORIDE 5 MG/ML
INJECTION, SOLUTION EPIDURAL; INTRACAUDAL AS NEEDED
Status: DISCONTINUED | OUTPATIENT
Start: 2021-08-26 | End: 2021-08-26 | Stop reason: HOSPADM

## 2021-08-26 RX ORDER — ONDANSETRON 2 MG/ML
4 INJECTION INTRAMUSCULAR; INTRAVENOUS EVERY 6 HOURS PRN
Status: DISCONTINUED | OUTPATIENT
Start: 2021-08-26 | End: 2021-08-26 | Stop reason: HOSPADM

## 2021-08-26 RX ORDER — LISINOPRIL 5 MG/1
5 TABLET ORAL DAILY
COMMUNITY

## 2021-08-26 RX ORDER — MIDAZOLAM HYDROCHLORIDE 1 MG/ML
INJECTION, SOLUTION INTRAMUSCULAR; INTRAVENOUS AS NEEDED
Status: DISCONTINUED | OUTPATIENT
Start: 2021-08-26 | End: 2021-08-26 | Stop reason: HOSPADM

## 2021-08-26 RX ORDER — CEPHALEXIN 500 MG/1
500 CAPSULE ORAL EVERY 12 HOURS SCHEDULED
Status: DISCONTINUED | OUTPATIENT
Start: 2021-08-26 | End: 2021-08-27 | Stop reason: HOSPADM

## 2021-08-26 RX ORDER — VANCOMYCIN HYDROCHLORIDE 1 G/200ML
1000 INJECTION, SOLUTION INTRAVENOUS ONCE
Status: COMPLETED | OUTPATIENT
Start: 2021-08-26 | End: 2021-08-26

## 2021-08-26 RX ORDER — FENTANYL CITRATE 50 UG/ML
INJECTION, SOLUTION INTRAMUSCULAR; INTRAVENOUS AS NEEDED
Status: DISCONTINUED | OUTPATIENT
Start: 2021-08-26 | End: 2021-08-26 | Stop reason: HOSPADM

## 2021-08-26 RX ORDER — CEPHALEXIN 500 MG/1
500 CAPSULE ORAL 2 TIMES DAILY
Qty: 3 CAPSULE | Refills: 0 | Status: SHIPPED | OUTPATIENT
Start: 2021-08-26 | End: 2021-08-28

## 2021-08-26 RX ORDER — LISINOPRIL 5 MG/1
5 TABLET ORAL DAILY
Status: DISCONTINUED | OUTPATIENT
Start: 2021-08-27 | End: 2021-08-27 | Stop reason: HOSPADM

## 2021-08-26 RX ORDER — SODIUM CHLORIDE 0.9 % (FLUSH) 0.9 %
10 SYRINGE (ML) INJECTION AS NEEDED
Status: DISCONTINUED | OUTPATIENT
Start: 2021-08-26 | End: 2021-08-26 | Stop reason: HOSPADM

## 2021-08-26 RX ORDER — CARVEDILOL 25 MG/1
25 TABLET ORAL 2 TIMES DAILY WITH MEALS
Status: DISCONTINUED | OUTPATIENT
Start: 2021-08-26 | End: 2021-08-27 | Stop reason: HOSPADM

## 2021-08-26 RX ORDER — ASPIRIN 325 MG
162 TABLET ORAL DAILY
COMMUNITY

## 2021-08-26 RX ORDER — SODIUM CHLORIDE 0.9 % (FLUSH) 0.9 %
3 SYRINGE (ML) INJECTION EVERY 12 HOURS SCHEDULED
Status: DISCONTINUED | OUTPATIENT
Start: 2021-08-26 | End: 2021-08-26 | Stop reason: HOSPADM

## 2021-08-26 RX ORDER — CARVEDILOL 25 MG/1
25 TABLET ORAL 2 TIMES DAILY WITH MEALS
COMMUNITY

## 2021-08-26 RX ADMIN — CEPHALEXIN 500 MG: 500 CAPSULE ORAL at 20:18

## 2021-08-26 RX ADMIN — CARVEDILOL 25 MG: 25 TABLET, FILM COATED ORAL at 20:18

## 2021-08-26 RX ADMIN — VANCOMYCIN HYDROCHLORIDE 1000 MG: 1 INJECTION, SOLUTION INTRAVENOUS at 12:50

## 2021-08-26 NOTE — H&P
"No chief complaint on file.      Subjective .     History of present illness:  Luisito Dolan is a 70 y.o. yo male with history of CHF, s/p SICD at [facility] who presents today for SICD generator change out due to battery end of life indicators. He has no specific complaints    History  Past Medical History:   Diagnosis Date   • CHF (congestive heart failure) (CMS/HCC)    • Coronary artery disease    ,   History reviewed. No pertinent surgical history.,   History reviewed. No pertinent family history.,   Social History     Tobacco Use   • Smoking status: Not on file   Substance Use Topics   • Alcohol use: Not on file   • Drug use: Not on file   ,     Medications  Current Facility-Administered Medications   Medication Dose Route Frequency Provider Last Rate Last Admin   • ondansetron (ZOFRAN) injection 4 mg  4 mg Intravenous Q6H PRN Suhail Molina MD       • sodium chloride 0.9 % flush 10 mL  10 mL Intravenous PRN Suhail Molina MD       • sodium chloride 0.9 % flush 3 mL  3 mL Intravenous Q12H Suhail Molina MD       • vancomycin (VANCOCIN) in iso-osmotic dextrose IVPB 1 g (premix) 200 mL  1,000 mg Intravenous Once MolinaSuhail sterling MD   1,000 mg at 08/26/21 1250       Allergies:  Patient has no known allergies.    Review of Systems  Review of Systems   HENT: Negative for nosebleeds.    Cardiovascular: Positive for dyspnea on exertion. Negative for chest pain, claudication, leg swelling, near-syncope, orthopnea, palpitations, paroxysmal nocturnal dyspnea and syncope.   Respiratory: Negative for hemoptysis and shortness of breath.    Gastrointestinal: Negative for melena.   Genitourinary: Negative for hematuria.       Objective     Physical Exam:  Patient Vitals for the past 24 hrs:   BP Temp Pulse Resp SpO2 Height Weight   08/26/21 1246 -- -- -- -- -- 167.6 cm (66\") 74.8 kg (165 lb)   08/26/21 1240 170/92 98.6 °F (37 °C) 65 13 99 % -- --     Pulmonary:      Effort: Pulmonary effort is normal.      Breath sounds: Normal " breath sounds.   Cardiovascular:      Normal rate. Regular rhythm.      Murmurs: There is no murmur.   Edema:     Peripheral edema absent.         Results Review:   I reviewed the patient's new clinical results.  Lab Results (last 24 hours)     Procedure Component Value Units Date/Time    CBC (No Diff) [[id number]]  (Abnormal) Collected: 08/26/21 1128    Specimen: Blood Updated: 08/26/21 1148     WBC 5.70 10*3/mm3      RBC 4.05 10*6/mm3      Hemoglobin 12.9 g/dL      Hematocrit 36.8 %      MCV 90.9 fL      MCH 31.9 pg      MCHC 35.1 g/dL      RDW 12.5 %      RDW-SD 41.6 fl      MPV 10.0 fL      Platelets 150 10*3/mm3         Imaging Results (Last 24 Hours)     ** No results found for the last 24 hours. **        No results found for: ECHOEFEST      Assessment/Plan     AICD at end of battery life    New problems that need assessment:  1. SICD battery at end of life. Agree with battery change out. The risk and potential complications as well as anticipated benefits were discussed with the patient and he wishes to proceed with the planned procedure. I recommend heavy sedation with anesthesia but he declines and just wants to have moderate sedation.    MDM Moderate Complexity

## 2021-08-27 VITALS
DIASTOLIC BLOOD PRESSURE: 50 MMHG | SYSTOLIC BLOOD PRESSURE: 104 MMHG | TEMPERATURE: 97.8 F | HEIGHT: 66 IN | WEIGHT: 165.12 LBS | BODY MASS INDEX: 26.54 KG/M2 | OXYGEN SATURATION: 98 % | RESPIRATION RATE: 16 BRPM | HEART RATE: 69 BPM

## 2021-08-27 PROCEDURE — A9270 NON-COVERED ITEM OR SERVICE: HCPCS | Performed by: INTERNAL MEDICINE

## 2021-08-27 PROCEDURE — G0378 HOSPITAL OBSERVATION PER HR: HCPCS

## 2021-08-27 PROCEDURE — 63710000001 CEPHALEXIN 500 MG CAPSULE: Performed by: INTERNAL MEDICINE

## 2021-08-27 PROCEDURE — 63710000001 LISINOPRIL 5 MG TABLET: Performed by: INTERNAL MEDICINE

## 2021-08-27 PROCEDURE — 63710000001 CARVEDILOL 25 MG TABLET: Performed by: INTERNAL MEDICINE

## 2021-08-27 RX ORDER — CEPHALEXIN 500 MG/1
500 CAPSULE ORAL EVERY 12 HOURS SCHEDULED
Qty: 2 CAPSULE | Refills: 0 | Status: SHIPPED | OUTPATIENT
Start: 2021-08-27 | End: 2021-08-28

## 2021-08-27 RX ADMIN — LISINOPRIL 5 MG: 5 TABLET ORAL at 08:14

## 2021-08-27 RX ADMIN — CARVEDILOL 25 MG: 25 TABLET, FILM COATED ORAL at 08:14

## 2021-08-27 RX ADMIN — CEPHALEXIN 500 MG: 500 CAPSULE ORAL at 08:14

## 2021-08-27 NOTE — SIGNIFICANT NOTE
Patient became very angry and began yelling at me and threatening to leave AMA when I told him and his wife the visiting hours and that she would not be able to stay the night.  Apparently someone in [facility] had already told them that the wife could stay the night. He kept yelling at me that it was not even his fault that he was even here.  He was supposed to have come in for a procedure and left.  Considering Patient may have to have an evacuation of the hematoma tomorrow, I did not feel it was safe for him to leave.  I opted to let the wife stay.

## 2021-08-27 NOTE — DISCHARGE SUMMARY
"[facility]ART [facility] DISCHARGE    Date of Discharge:  8/27/2021    Discharge Diagnosis:     AICD at end of battery life      Presenting Problem/History of Present Illness  AICD at end of battery life [Z45.02]    Hospital Course  Luisito Dolan is a 70 y.o. yo male with history of ischemic cardiomyopathy follow at [facility]. He had an SICD placed at [facility] in the past who presented to have the SICD battery changed out. This was accomplished without difficulty but just before he was to be discharged he developed sudden painful swelling in the area of the AICD pocket. A compression bandage was applied and he was admitted for observation. The following morning he felt much better and was discharged in stable condition with a small to moderate pocket hematoma. He has been advised to gradually increase his activity level and contact my office if he has any bleeding from the SICD pocket or an increase in swelling. Otherwise he will follow up with his regular cardiologist at [facility].    Procedures Performed  Procedure(s):  ICD battery change SICD       Consults:   Consults     No orders found for last 30 day(s).          Physical Exam at Discharge  Patient Vitals for the past 24 hrs:   BP Temp Temp src Pulse Resp SpO2 Height Weight   08/27/21 1134 104/50 97.8 °F (36.6 °C) Oral 69 16 98 % -- --   08/27/21 0749 145/72 98.6 °F (37 °C) Oral 85 16 98 % -- --   08/27/21 0403 143/64 98 °F (36.7 °C) Oral 79 16 97 % -- --   08/26/21 2258 117/66 97.8 °F (36.6 °C) Oral 84 16 96 % -- --   08/26/21 1856 153/74 97.6 °F (36.4 °C) Oral 77 18 99 % 167.6 cm (65.98\") 74.9 kg (165 lb 2 oz)   08/26/21 1800 174/97 -- -- 73 23 100 % -- --   08/26/21 1730 157/77 -- -- 58 18 99 % -- --   08/26/21 1700 137/89 -- -- 78 20 100 % -- --   08/26/21 1630 145/65 -- -- 77 18 97 % -- --   08/26/21 1600 136/78 -- -- 75 17 98 % -- --   08/26/21 1545 141/70 -- -- 57 13 97 % -- --   08/26/21 1530 127/73 -- -- 51 12 97 % -- --   08/26/21 1520 " "141/68 -- -- 54 14 98 % -- --   08/26/21 1515 128/70 -- -- 58 13 96 % -- --   08/26/21 1510 127/84 -- -- 58 13 99 % -- --   08/26/21 1506 135/84 -- -- 61 15 98 % -- --   08/26/21 1348 -- -- -- -- -- 100 % -- --   08/26/21 1246 -- -- -- -- -- -- 167.6 cm (66\") 74.8 kg (165 lb)   08/26/21 1240 170/92 98.6 °F (37 °C) -- 65 13 99 % -- --     Pulmonary:      Effort: Pulmonary effort is normal.      Breath sounds: Normal breath sounds.   Cardiovascular:      Normal rate. Regular rhythm.      Murmurs: There is no murmur.   Edema:     Peripheral edema absent.   Musculoskeletal:        Arms:        Discharge Disposition  Home or Self Care    Discharge Medications     Discharge Medications      New Medications      Instructions Start Date   cephalexin 500 MG capsule  Commonly known as: Keflex   500 mg, Oral, 2 Times Daily      cephalexin 500 MG capsule  Commonly known as: KEFLEX   500 mg, Oral, 2 times daily      cephalexin 500 MG capsule  Commonly known as: KEFLEX   500 mg, Oral, Every 12 Hours Scheduled         Continue These Medications      Instructions Start Date   aspirin 325 MG tablet   162 mg, Oral, Daily      carvedilol 25 MG tablet  Commonly known as: COREG   25 mg, Oral, 2 Times Daily With Meals      lisinopril 5 MG tablet  Commonly known as: PRINIVIL,ZESTRIL   5 mg, Oral, Daily               Follow-up Appointments  No future appointments.      Test Results Pending at Discharge       Suhail Molina MD  08/27/21  12:08 CDT    Time: Discharge 20 min   "

## 2021-08-31 ENCOUNTER — TELEPHONE (OUTPATIENT)
Dept: CARDIOLOGY | Facility: CLINIC | Age: 71
End: 2021-08-31

## 2021-08-31 NOTE — TELEPHONE ENCOUNTER
Addended by: SABAS SETH on: 7/8/2021 03:39 PM     Modules accepted: Orders     Mr. Dolan wants you to know that his wound looked perfect when the gauze was removed.  He appreciated the care he received from you.

## 2021-09-03 DIAGNOSIS — I25.10 CORONARY ARTERY DISEASE INVOLVING NATIVE CORONARY ARTERY OF NATIVE HEART WITHOUT ANGINA PECTORIS: ICD-10-CM

## 2021-09-03 DIAGNOSIS — Z95.810 AICD (AUTOMATIC CARDIOVERTER/DEFIBRILLATOR) PRESENT: Primary | ICD-10-CM

## 2021-09-20 ENCOUNTER — TRANSCRIBE ORDERS (OUTPATIENT)
Dept: ADMINISTRATIVE | Facility: HOSPITAL | Age: 71
End: 2021-09-20

## 2021-09-20 ENCOUNTER — HOSPITAL ENCOUNTER (OUTPATIENT)
Dept: CARDIOLOGY | Facility: HOSPITAL | Age: 71
Discharge: HOME | End: 2021-09-20

## 2021-09-20 ENCOUNTER — OFFICE VISIT (OUTPATIENT)
Dept: CARDIOLOGY CLINIC | Age: 71
End: 2021-09-20
Payer: MEDICARE

## 2021-09-20 VITALS
HEIGHT: 67 IN | DIASTOLIC BLOOD PRESSURE: 64 MMHG | SYSTOLIC BLOOD PRESSURE: 108 MMHG | BODY MASS INDEX: 25.11 KG/M2 | HEART RATE: 68 BPM | WEIGHT: 160 LBS

## 2021-09-20 DIAGNOSIS — I10 ESSENTIAL HYPERTENSION: ICD-10-CM

## 2021-09-20 DIAGNOSIS — I65.23 BILATERAL CAROTID ARTERY STENOSIS: ICD-10-CM

## 2021-09-20 DIAGNOSIS — I25.10 CORONARY ARTERY DISEASE INVOLVING NATIVE CORONARY ARTERY OF NATIVE HEART WITHOUT ANGINA PECTORIS: ICD-10-CM

## 2021-09-20 DIAGNOSIS — I42.8 NONISCHEMIC CARDIOMYOPATHY (HCC): ICD-10-CM

## 2021-09-20 DIAGNOSIS — Z95.810 AICD (AUTOMATIC CARDIOVERTER/DEFIBRILLATOR) PRESENT: Primary | ICD-10-CM

## 2021-09-20 PROBLEM — Z91.89 POTENTIAL FOR INFECTION: Status: ACTIVE | Noted: 2021-09-20

## 2021-09-20 PROCEDURE — 4040F PNEUMOC VAC/ADMIN/RCVD: CPT | Performed by: NURSE PRACTITIONER

## 2021-09-20 PROCEDURE — G8427 DOCREV CUR MEDS BY ELIG CLIN: HCPCS | Performed by: NURSE PRACTITIONER

## 2021-09-20 PROCEDURE — G8417 CALC BMI ABV UP PARAM F/U: HCPCS | Performed by: NURSE PRACTITIONER

## 2021-09-20 PROCEDURE — 3017F COLORECTAL CA SCREEN DOC REV: CPT | Performed by: NURSE PRACTITIONER

## 2021-09-20 PROCEDURE — 1123F ACP DISCUSS/DSCN MKR DOCD: CPT | Performed by: NURSE PRACTITIONER

## 2021-09-20 PROCEDURE — 99214 OFFICE O/P EST MOD 30 MIN: CPT | Performed by: NURSE PRACTITIONER

## 2021-09-20 PROCEDURE — 1036F TOBACCO NON-USER: CPT | Performed by: NURSE PRACTITIONER

## 2021-09-20 ASSESSMENT — ENCOUNTER SYMPTOMS
COUGH: 0
VOMITING: 0
WHEEZING: 0
EYE REDNESS: 0
ABDOMINAL DISTENTION: 0
TROUBLE SWALLOWING: 0
COLOR CHANGE: 0
SHORTNESS OF BREATH: 0
BLOOD IN STOOL: 0
NAUSEA: 0
FACIAL SWELLING: 0
ABDOMINAL PAIN: 0
EYE DISCHARGE: 0

## 2021-09-20 NOTE — PATIENT INSTRUCTIONS
No orders of the defined types were placed in this encounter. Return for Dr. Jesus Pete at [address] [place] [address] for wound/device check. Call with any questionsor concerns  Follow up with Agnes Eaton MD for non cardiac problems  Report any new problems  Cardiovascular Fitness-Exercise as tolerated. Strive for 15 minutes of exercise most days of the week. Cardiac / HealthyDiet  Continue current medications as directed  Continue plan of treatment  It is always recommended that you bring your medicationsbottles with you to each visit - this is for your safety!

## 2021-09-20 NOTE — PROGRESS NOTES
[address] [place] [address] Cardiology  [address]  [address]  Phone: [phone]  Fax: [phone]    OFFICE VISIT:  9/20/2021    [address] [place] [address] Street: 1950    Reason For Visit:  Ami Hoff is a 78 y.o. male who is here for Follow Up After Procedure (S/p ICD gen change) and Coronary Artery Disease (No cardiac symptoms.)      HPI    Mr. Daly Hdz is a 78 y.o. male with history of coronary artery disease, hypertension, hyperlipidemia, diabetes, former nicotine dependence, and ischemic cardiomyopathy with AICD in place who presents with the chief complaint of follow-up for pacemaker gen change. On 8/26/2021 patient underwent subcutaneous generator change of his AICD with Dr. Neeraj Saxena at [facility].  Patient states he has been doing well since generator change. Patient states he seen Dr. Jet Graff last week for bilateral carotid stenosis. He is cannot undergo additional testing next week. Ami Hoff has no exertional chest pain, pressure, burning, tightness or squeezing. No symptomatic tachy- or didi-arrhythmia. No lightheadedness, dizziness, or syncope. No numbness or weakness to suggest cerebrovascular accident or transient ischemic attack. he denies signs of bleeding. Reports no edema or shortness of breath. He denies orthopnea or paroxysmal nocturnal dyspnea. he is sleeping on 1 pillow at night. he has been compliant with his medications. his BP has been controlled at home. he reports no activity change. PCP follows lipids and labs. Ash Clinton MD is PCP.   Twyla Montesinos has the following history as recorded in [facility]:    Patient Active Problem List    Diagnosis Date Noted    Essential hypertension 01/18/2021    Chronic systolic congestive heart failure ([address] 06/01/2018    Coronary artery disease involving native coronary artery of native heart without angina pectoris 10/04/2017    Nonischemic cardiomyopathy ([address] 10/04/2017    Mixed hyperlipidemia 10/04/2017    Type 2 diabetes mellitus with complication, with long-term current use of insulin ([facility] 10/04/2017    AICD (automatic cardioverter/defibrillator) present 10/04/2017     Past Medical History:   Diagnosis Date    AICD (automatic cardioverter/defibrillator) present 10/4/2017    Cardiac defibrillator in place     Chronic systolic congestive heart failure ([facility] 6/1/2018    Coronary artery disease involving native coronary artery of native heart without angina pectoris 10/4/2017    Hypertension     Mixed hyperlipidemia 10/4/2017    Myocardial infarction ([facility]     Nonischemic cardiomyopathy ([facility] 10/4/2017    Type 2 diabetes mellitus with complication, with long-term current use of insulin ([facility] 10/4/2017     Past Surgical History:   Procedure Laterality Date    CARDIAC CATHETERIZATION  6/9/14  JDT    diffuse CAD. EF 5%    CARDIAC DEFIBRILLATOR PLACEMENT  2014    Subcutaneous AICD, [place]    NECK SURGERY      SHOULDER SURGERY       History reviewed. No pertinent family history. Social History     Tobacco Use    Smoking status: Former Smoker     Packs/day: 0.00     Types: Cigarettes    Smokeless tobacco: Never Used   Substance Use Topics    Alcohol use: Yes      Current Outpatient Medications   Medication Sig Dispense Refill    lisinopril (PRINIVIL;ZESTRIL) 5 MG tablet Take 1 tablet by mouth once daily 90 tablet 3    carvedilol (COREG) 25 MG tablet TAKE 1 TABLET BY MOUTH TWICE DAILY WITH MEALS 180 tablet 3    Multiple Vitamins-Minerals (CENTRUM SILVER ADULT 50+) TABS Take 1 tablet by mouth daily      Ferrous Sulfate (IRON PO) Take by mouth daily      DOCUSATE CALCIUM PO Take 250 mg by mouth daily      Flaxseed, Linseed, (FLAX SEED OIL PO) Take 1 tablet by mouth daily Indications: 1000mg soft gel capsule       aspirin 325 MG tablet 1/2 tablet daily        No current facility-administered medications for this visit.      Allergies: Bee venom and Nsaids    Review of Systems  Review of Systems   Constitutional: Negative for activity change, diaphoresis, fatigue, fever and unexpected weight change. HENT: Negative for facial swelling, nosebleeds and trouble swallowing. Eyes: Negative for discharge, redness and visual disturbance. Respiratory: Negative for cough, shortness of breath and wheezing. Cardiovascular: Negative for chest pain, palpitations and leg swelling. Gastrointestinal: Negative for abdominal distention, abdominal pain, blood in stool, nausea and vomiting. Endocrine: Negative for cold intolerance and heat intolerance. Genitourinary: Negative for dysuria and hematuria. Musculoskeletal: Negative for gait problem. Skin: Negative for color change, pallor and rash. Neurological: Negative for dizziness, syncope, facial asymmetry and light-headedness. Hematological: Does not bruise/bleed easily. Psychiatric/Behavioral: Negative for behavioral problems and confusion. All other systems reviewed and are negative. Objective  Vital Signs - /64   Pulse 68   Ht 5' 7\" (1.702 m)   Wt 160 lb (72.6 kg)   BMI 25.06 kg/m²   Physical Exam  Vitals and nursing note reviewed. Constitutional:       Appearance: He is well-developed. HENT:      Head: Normocephalic and atraumatic. Right Ear: External ear normal.      Left Ear: External ear normal.      Nose: Nose normal.   Eyes:      General:         Right eye: No discharge. Left eye: No discharge. Pupils: Pupils are equal, round, and reactive to light. Neck:      Vascular: Carotid bruit present. No JVD. Trachea: No tracheal deviation. Cardiovascular:      Rate and Rhythm: Normal rate and regular rhythm. Heart sounds: Murmur (1/6) heard. No friction rub. No gallop. Pulmonary:      Effort: Pulmonary effort is normal. No respiratory distress. Breath sounds: No wheezing, rhonchi or rales. Abdominal:      General: Bowel sounds are normal. There is no distension. Palpations: Abdomen is soft. Tenderness: There is no abdominal tenderness. Musculoskeletal:         General: Normal range of motion. Cervical back: Normal range of motion. No edema. Skin:     General: Skin is warm and dry. Capillary Refill: Capillary refill takes less than 2 seconds. Findings: Erythema and wound present. No rash. Neurological:      Mental Status: He is alert and oriented to person, place, and time. Psychiatric:         Behavior: Behavior normal.         Judgment: Judgment normal.         Cardiac data:      6/9/2014 cath Diffuse CAD, severe nonischemic cardiomyopathy    Assessment, Recommendations, & Plan:  78 y.o. male with      Diagnosis Orders   1. AICD (automatic cardioverter/defibrillator) present     2. Coronary artery disease involving native coronary artery of native heart without angina pectoris     3. Nonischemic cardiomyopathy ([address]     4. Essential hypertension     5. Bilateral carotid artery stenosis         1. AICD- Pictures of site sent to [facility] with patient's permission and Dr. Lissette George would like to see patient today. Patient is leaving here to go to appt with Dr. Lissette George. Interrogated today VS, Battery life 99%, No episodes     2. CAD-on aspirin, beta-blocker, ACE inhibitor. Stable on current medication therapy. No change    3. Nonischemic cardiomyopathy-no recent 2D echo. Patient is asymptomatic and does not wish to undergo testing at this time. 4.  Hypertension-blood pressure today 108/64 continue AceI and BB    5.  Bilateral carotid artery stenosis- Patient has been evaluated by Dr. Rossy Thao and is undergoing additional testing, recommendation and treatment plan per Dr. Rossy Thao. carotid ultrasound report from [facility] shows: There is elevation of the ICA peak systolic velocities and ICA/CCA ratios left greater than right most consistent with 80 to 99% stenosis. No orders of the defined types were placed in this encounter.     Return for [facility]. Lissette George at [facility] ASAP for wound/device check. Call with any questionsor concerns  Follow up with Randa Barbour MD for non cardiac problems  Report any new problems  Cardiovascular Fitness-Exercise as tolerated. Strive for 15 minutes of exercise most days of the week. Cardiac / HealthyDiet  Continue current medications as directed  Continue plan of treatment  It is always recommended that you bring your medicationsbottles with you to each visit - this is for your safety! Please do not hesitate to contact me for any questions or concerns. Sincerely yours,    SMILEY Box    This dictation was generated by voice recognition computer software. Although all attempts are made to edit dictation for accuracy, there may be errors in the transcription that are not intended.

## 2021-10-21 ENCOUNTER — TELEPHONE (OUTPATIENT)
Dept: CARDIOLOGY CLINIC | Age: 71
End: 2021-10-21

## 2021-10-21 NOTE — TELEPHONE ENCOUNTER
Called and tried to leave v/m for patient to return call to r/s appt. No v/m. Advised patient needing to move appt earlier or later as her appt was scheduled incorrectly during lunch hour.

## 2021-11-02 DIAGNOSIS — I50.22 CHRONIC SYSTOLIC CONGESTIVE HEART FAILURE (HCC): ICD-10-CM

## 2021-11-02 DIAGNOSIS — I42.8 NONISCHEMIC CARDIOMYOPATHY (HCC): ICD-10-CM

## 2021-11-02 DIAGNOSIS — Z95.810 AICD (AUTOMATIC CARDIOVERTER/DEFIBRILLATOR) PRESENT: Primary | ICD-10-CM

## 2021-11-02 PROCEDURE — 93295 DEV INTERROG REMOTE 1/2/MLT: CPT | Performed by: NURSE PRACTITIONER

## 2021-11-02 PROCEDURE — 93296 REM INTERROG EVL PM/IDS: CPT | Performed by: NURSE PRACTITIONER

## 2021-11-10 ENCOUNTER — TELEPHONE (OUTPATIENT)
Dept: CARDIOLOGY CLINIC | Age: 71
End: 2021-11-10

## 2021-11-10 NOTE — TELEPHONE ENCOUNTER
Called and spoke with patient, he has an appt on 12/1 with Dr. Shana Donaldson, will keep that appt and cancel one with Brianna Arora in March. Patient will get next f/u after appt on 12/1. Patient verbally understood.

## 2021-11-10 NOTE — TELEPHONE ENCOUNTER
There are notes documented in telephone encounter and on patient's appt list.  Will call patient back appt time needing to be changed.

## 2021-11-22 ENCOUNTER — TELEPHONE (OUTPATIENT)
Dept: CARDIOLOGY CLINIC | Age: 71
End: 2021-11-22

## 2021-11-22 NOTE — TELEPHONE ENCOUNTER
Wife called to reschedule a new to provider with Dr. Kevon Connors due to her work schedule. Please be advised that the best time to call him to accommodate their needs is Anytime. Thank you.

## 2021-11-22 NOTE — TELEPHONE ENCOUNTER
Wife called to schedule a hospital f/u from [facility] for in 2 weeks with Maddie Trejo. I let wife know to call them to request records. Please be advised that the best time to call him to accommodate their needs is Anytime. Thank you.

## 2021-11-22 NOTE — TELEPHONE ENCOUNTER
Patients wife called to reschedule a NTP appt. With Dr. Cleve Villatoro MD. Psc was unable to schedule in the time frame requested. Please be advised that the best time to call him to accommodate their needs is Anytime. Thank you.

## 2021-12-07 DIAGNOSIS — I50.22 CHRONIC SYSTOLIC CONGESTIVE HEART FAILURE (HCC): ICD-10-CM

## 2021-12-07 DIAGNOSIS — I42.8 NONISCHEMIC CARDIOMYOPATHY (HCC): ICD-10-CM

## 2021-12-07 DIAGNOSIS — Z95.810 AICD (AUTOMATIC CARDIOVERTER/DEFIBRILLATOR) PRESENT: Primary | ICD-10-CM

## 2021-12-07 PROCEDURE — 93296 REM INTERROG EVL PM/IDS: CPT | Performed by: NURSE PRACTITIONER

## 2021-12-07 PROCEDURE — 93295 DEV INTERROG REMOTE 1/2/MLT: CPT | Performed by: NURSE PRACTITIONER

## 2021-12-07 NOTE — PROGRESS NOTES
Remote ICD interrogation report received. Spoke to patient. He stated he had right carotid surgery on 11/11/21. His staples were removed yesterday. He denies any cardiac complaints.

## 2021-12-13 ENCOUNTER — TELEPHONE (OUTPATIENT)
Dept: CARDIOLOGY CLINIC | Age: 71
End: 2021-12-13

## 2021-12-15 NOTE — TELEPHONE ENCOUNTER
Tried to reach patient to r/s appt on 12/20/21 with Josue Parr due to her being out of office.  NO answer, left v/m

## 2022-02-21 DIAGNOSIS — I42.8 NONISCHEMIC CARDIOMYOPATHY (HCC): ICD-10-CM

## 2022-02-21 DIAGNOSIS — I50.22 CHRONIC SYSTOLIC CONGESTIVE HEART FAILURE (HCC): ICD-10-CM

## 2022-02-21 DIAGNOSIS — Z95.810 AICD (AUTOMATIC CARDIOVERTER/DEFIBRILLATOR) PRESENT: Primary | ICD-10-CM

## 2022-03-11 DIAGNOSIS — I42.8 NONISCHEMIC CARDIOMYOPATHY (HCC): ICD-10-CM

## 2022-03-11 DIAGNOSIS — I50.22 CHRONIC SYSTOLIC CONGESTIVE HEART FAILURE (HCC): ICD-10-CM

## 2022-03-11 DIAGNOSIS — Z95.810 AICD (AUTOMATIC CARDIOVERTER/DEFIBRILLATOR) PRESENT: Primary | ICD-10-CM

## 2022-03-11 PROCEDURE — 93296 REM INTERROG EVL PM/IDS: CPT | Performed by: NURSE PRACTITIONER

## 2022-03-11 PROCEDURE — 93295 DEV INTERROG REMOTE 1/2/MLT: CPT | Performed by: NURSE PRACTITIONER

## 2022-03-18 ENCOUNTER — TELEPHONE (OUTPATIENT)
Dept: VASCULAR SURGERY | Age: 72
End: 2022-03-18

## 2022-03-25 ENCOUNTER — TELEPHONE (OUTPATIENT)
Dept: CARDIOLOGY CLINIC | Age: 72
End: 2022-03-25

## 2022-03-28 ENCOUNTER — OFFICE VISIT (OUTPATIENT)
Dept: CARDIOLOGY CLINIC | Age: 72
End: 2022-03-28
Payer: MEDICARE

## 2022-03-28 VITALS
WEIGHT: 162 LBS | HEIGHT: 67 IN | BODY MASS INDEX: 25.43 KG/M2 | SYSTOLIC BLOOD PRESSURE: 140 MMHG | DIASTOLIC BLOOD PRESSURE: 88 MMHG | HEART RATE: 99 BPM | OXYGEN SATURATION: 97 %

## 2022-03-28 DIAGNOSIS — I10 ESSENTIAL HYPERTENSION: ICD-10-CM

## 2022-03-28 DIAGNOSIS — I25.5 ISCHEMIC CARDIOMYOPATHY: ICD-10-CM

## 2022-03-28 DIAGNOSIS — I25.10 CORONARY ARTERY DISEASE INVOLVING NATIVE CORONARY ARTERY OF NATIVE HEART WITHOUT ANGINA PECTORIS: Primary | ICD-10-CM

## 2022-03-28 DIAGNOSIS — E78.5 DYSLIPIDEMIA: ICD-10-CM

## 2022-03-28 PROCEDURE — 99214 OFFICE O/P EST MOD 30 MIN: CPT | Performed by: NURSE PRACTITIONER

## 2022-03-28 PROCEDURE — G8417 CALC BMI ABV UP PARAM F/U: HCPCS | Performed by: NURSE PRACTITIONER

## 2022-03-28 PROCEDURE — G8427 DOCREV CUR MEDS BY ELIG CLIN: HCPCS | Performed by: NURSE PRACTITIONER

## 2022-03-28 PROCEDURE — 3017F COLORECTAL CA SCREEN DOC REV: CPT | Performed by: NURSE PRACTITIONER

## 2022-03-28 PROCEDURE — 4040F PNEUMOC VAC/ADMIN/RCVD: CPT | Performed by: NURSE PRACTITIONER

## 2022-03-28 PROCEDURE — 1036F TOBACCO NON-USER: CPT | Performed by: NURSE PRACTITIONER

## 2022-03-28 PROCEDURE — G8484 FLU IMMUNIZE NO ADMIN: HCPCS | Performed by: NURSE PRACTITIONER

## 2022-03-28 PROCEDURE — 1123F ACP DISCUSS/DSCN MKR DOCD: CPT | Performed by: NURSE PRACTITIONER

## 2022-03-28 RX ORDER — ASPIRIN 81 MG/1
81 TABLET ORAL DAILY
COMMUNITY
End: 2022-10-24 | Stop reason: SDUPTHER

## 2022-03-28 RX ORDER — LISINOPRIL 5 MG/1
5 TABLET ORAL DAILY
Qty: 90 TABLET | Refills: 3 | Status: SHIPPED | OUTPATIENT
Start: 2022-03-28 | End: 2022-09-26

## 2022-03-28 ASSESSMENT — ENCOUNTER SYMPTOMS
WHEEZING: 0
SHORTNESS OF BREATH: 0
SORE THROAT: 0
COUGH: 0
CHEST TIGHTNESS: 0

## 2022-03-28 NOTE — PROGRESS NOTES
Ferrous Sulfate (IRON PO) Take by mouth daily      Flaxseed, Linseed, (FLAX SEED OIL PO) Take 1 tablet by mouth daily Indications: 1000mg soft gel capsule        No current facility-administered medications for this visit. Allergies: Bee venom and Nsaids  Past Medical History:   Diagnosis Date    AICD (automatic cardioverter/defibrillator) present 10/4/2017    Cardiac defibrillator in place     Chronic systolic congestive heart failure ([facility] 6/1/2018    Coronary artery disease involving native coronary artery of native heart without angina pectoris 10/4/2017    Hypertension     Mixed hyperlipidemia 10/4/2017    Myocardial infarction ([facility]     Nonischemic cardiomyopathy ([facility] 10/4/2017    Type 2 diabetes mellitus with complication, with long-term current use of insulin ([facility] 10/4/2017     Past Surgical History:   Procedure Laterality Date    CARDIAC CATHETERIZATION  6/9/14  JDT    diffuse CAD. EF 5%    CARDIAC DEFIBRILLATOR PLACEMENT  2014    Subcutaneous AICD, [place]    NECK SURGERY      SHOULDER SURGERY       History reviewed. No pertinent family history. Social History     Tobacco Use    Smoking status: Former Smoker     Packs/day: 0.00     Types: Cigarettes    Smokeless tobacco: Never Used   Substance Use Topics    Alcohol use: Yes          Review of Systems:    Review of Systems   Constitutional: Negative for activity change, chills, diaphoresis, fatigue and fever. HENT: Negative for congestion and sore throat. Respiratory: Negative for cough, chest tightness, shortness of breath and wheezing. Cardiovascular: Negative for chest pain, palpitations and leg swelling. Neurological: Negative for dizziness, syncope, light-headedness and headaches. Psychiatric/Behavioral: Negative for confusion. The patient is not nervous/anxious.          Objective:    BP (!) 140/88   Pulse 99   Ht 5' 7\" (1.702 m)   Wt 162 lb (73.5 kg)   SpO2 97%   BMI 25.37 kg/m²     GENERAL - well developed on Coreg and lisinopril. No Continue current medications:       Yes     No orders of the defined types were placed in this encounter. Orders Placed This Encounter   Medications    lisinopril (PRINIVIL;ZESTRIL) 5 MG tablet     Sig: Take 1 tablet by mouth daily     Dispense:  90 tablet     Refill:  3       Discussed with patient. Return in about 6 months (around 9/28/2022) for 6 months with me and one year with Dr Lalo Alvarenga . I greatly appreciate the opportunity to meet Janet Saniya and your confidence in allowing me to participate in his cardiovascular care. SMILEY Patel NP  3/28/2022 1:23 PM CDT                    This dictation was generated by voice recognition computer software. Although all attempts are made to edit dictation for accuracy, there may be errors in the transcription that are not intended.

## 2022-05-24 RX ORDER — CARVEDILOL 25 MG/1
TABLET ORAL
Qty: 180 TABLET | Refills: 1 | Status: SHIPPED | OUTPATIENT
Start: 2022-05-24 | End: 2022-10-18

## 2022-06-14 DIAGNOSIS — I50.22 CHRONIC SYSTOLIC CONGESTIVE HEART FAILURE (HCC): ICD-10-CM

## 2022-06-14 DIAGNOSIS — Z95.810 AICD (AUTOMATIC CARDIOVERTER/DEFIBRILLATOR) PRESENT: Primary | ICD-10-CM

## 2022-06-14 DIAGNOSIS — I25.5 ISCHEMIC CARDIOMYOPATHY: ICD-10-CM

## 2022-06-14 PROCEDURE — 93295 DEV INTERROG REMOTE 1/2/MLT: CPT | Performed by: NURSE PRACTITIONER

## 2022-06-14 PROCEDURE — 93296 REM INTERROG EVL PM/IDS: CPT | Performed by: NURSE PRACTITIONER

## 2022-08-22 ENCOUNTER — TELEPHONE (OUTPATIENT)
Dept: CARDIOLOGY CLINIC | Age: 72
End: 2022-08-22

## 2022-09-13 DIAGNOSIS — I25.5 ISCHEMIC CARDIOMYOPATHY: ICD-10-CM

## 2022-09-13 DIAGNOSIS — Z95.810 AICD (AUTOMATIC CARDIOVERTER/DEFIBRILLATOR) PRESENT: Primary | ICD-10-CM

## 2022-09-13 DIAGNOSIS — I50.22 CHRONIC SYSTOLIC CONGESTIVE HEART FAILURE (HCC): ICD-10-CM

## 2022-09-13 PROCEDURE — 93296 REM INTERROG EVL PM/IDS: CPT | Performed by: NURSE PRACTITIONER

## 2022-09-13 PROCEDURE — 93295 DEV INTERROG REMOTE 1/2/MLT: CPT | Performed by: NURSE PRACTITIONER

## 2022-09-23 ENCOUNTER — TELEPHONE (OUTPATIENT)
Dept: CARDIOLOGY CLINIC | Age: 72
End: 2022-09-23

## 2022-09-26 ENCOUNTER — OFFICE VISIT (OUTPATIENT)
Dept: CARDIOLOGY CLINIC | Age: 72
End: 2022-09-26
Payer: MEDICARE

## 2022-09-26 VITALS
DIASTOLIC BLOOD PRESSURE: 52 MMHG | WEIGHT: 160.6 LBS | BODY MASS INDEX: 25.21 KG/M2 | HEIGHT: 67 IN | OXYGEN SATURATION: 94 % | SYSTOLIC BLOOD PRESSURE: 92 MMHG | HEART RATE: 74 BPM

## 2022-09-26 DIAGNOSIS — E78.5 DYSLIPIDEMIA: ICD-10-CM

## 2022-09-26 DIAGNOSIS — I25.5 ISCHEMIC CARDIOMYOPATHY: ICD-10-CM

## 2022-09-26 DIAGNOSIS — I10 ESSENTIAL HYPERTENSION: ICD-10-CM

## 2022-09-26 DIAGNOSIS — I25.10 CORONARY ARTERY DISEASE INVOLVING NATIVE CORONARY ARTERY OF NATIVE HEART WITHOUT ANGINA PECTORIS: Primary | ICD-10-CM

## 2022-09-26 DIAGNOSIS — I50.22 CHRONIC SYSTOLIC HEART FAILURE (HCC): ICD-10-CM

## 2022-09-26 PROCEDURE — 1123F ACP DISCUSS/DSCN MKR DOCD: CPT | Performed by: NURSE PRACTITIONER

## 2022-09-26 PROCEDURE — 1036F TOBACCO NON-USER: CPT | Performed by: NURSE PRACTITIONER

## 2022-09-26 PROCEDURE — 3017F COLORECTAL CA SCREEN DOC REV: CPT | Performed by: NURSE PRACTITIONER

## 2022-09-26 PROCEDURE — G8417 CALC BMI ABV UP PARAM F/U: HCPCS | Performed by: NURSE PRACTITIONER

## 2022-09-26 PROCEDURE — 99214 OFFICE O/P EST MOD 30 MIN: CPT | Performed by: NURSE PRACTITIONER

## 2022-09-26 PROCEDURE — G8427 DOCREV CUR MEDS BY ELIG CLIN: HCPCS | Performed by: NURSE PRACTITIONER

## 2022-09-26 RX ORDER — LISINOPRIL 5 MG/1
5 TABLET ORAL DAILY
Qty: 90 TABLET | Refills: 3 | Status: SHIPPED | OUTPATIENT
Start: 2022-09-26 | End: 2022-10-24 | Stop reason: SDUPTHER

## 2022-09-26 RX ORDER — ROSUVASTATIN CALCIUM 20 MG/1
20 TABLET, COATED ORAL DAILY
COMMUNITY
End: 2022-10-24 | Stop reason: SDUPTHER

## 2022-09-26 RX ORDER — SPIRONOLACTONE 25 MG/1
25 TABLET ORAL DAILY
COMMUNITY
End: 2022-10-24 | Stop reason: SDUPTHER

## 2022-09-26 RX ORDER — SACUBITRIL AND VALSARTAN 24; 26 MG/1; MG/1
1 TABLET, FILM COATED ORAL 2 TIMES DAILY
COMMUNITY
End: 2022-09-26 | Stop reason: SINTOL

## 2022-09-26 RX ORDER — CLOPIDOGREL BISULFATE 75 MG/1
75 TABLET ORAL DAILY
COMMUNITY
Start: 2022-09-12 | End: 2022-10-24 | Stop reason: SDUPTHER

## 2022-09-26 RX ORDER — FUROSEMIDE 40 MG/1
40 TABLET ORAL DAILY
COMMUNITY
End: 2022-10-24 | Stop reason: SDUPTHER

## 2022-09-26 ASSESSMENT — ENCOUNTER SYMPTOMS
COUGH: 0
SORE THROAT: 0
CHEST TIGHTNESS: 0
WHEEZING: 0
SHORTNESS OF BREATH: 0

## 2022-09-26 NOTE — PROGRESS NOTES
[facility] Cardiology   Established Patient Office Visit   [address] [facility]  [phone]        OFFICE VISIT:  2022    Yamila W Isael Rodriguez : 1950    Reason For Visit:  Chadwick Quintero is a 73 y.o. male who is here for 6 Month Follow-Up    1. Coronary artery disease involving native coronary artery of native heart without angina pectoris    2. Essential hypertension    3. Dyslipidemia    4. Chronic systolic heart failure ([address]    5. Ischemic cardiomyopathy      With a history of coronary artery disease, ischemic cardiomyopathy, AICD in situ, hypertension and hyperlipidemia. Patient presents to clinic today for 6-month follow-up. Wife states that patient had the end of  a carotid endarterectomy in [place]. She states that he had some congestive heart failure and was started on new medication. They stopped his lisinopril and started him on Entresto. Since that time he has felt very tired and fatigued. Home blood pressures running in the low 90s. He has no energy. He said he felt better on the lisinopril. He denies any chest pain, pressure or tightness. There is no shortness of breath, orthopnea or PND. He denies any lower extremity edema.         Subjective    Shakir Rosario is a 73 y.o. male with the following history as recorded in [facility]:    Patient Active Problem List    Diagnosis Date Noted    Essential hypertension 2021    Chronic systolic congestive heart failure ([address] 2018    Coronary artery disease involving native coronary artery of native heart without angina pectoris 10/04/2017    Nonischemic cardiomyopathy ([address] 10/04/2017    Mixed hyperlipidemia 10/04/2017    Type 2 diabetes mellitus with complication, with long-term current use of insulin ([address] 10/04/2017    AICD (automatic cardioverter/defibrillator) present 10/04/2017     Current Outpatient Medications   Medication Sig Dispense Refill    clopidogrel (PLAVIX) 75 MG tablet Take 75 mg by mouth daily      furosemide (LASIX) 40 MG tablet Take 40 mg by mouth daily      rosuvastatin (CRESTOR) 20 MG tablet Take 20 mg by mouth daily      spironolactone (ALDACTONE) 25 MG tablet Take 25 mg by mouth daily      lisinopril (PRINIVIL;ZESTRIL) 5 MG tablet Take 1 tablet by mouth daily 90 tablet 3    carvedilol (COREG) 25 MG tablet TAKE 1 TABLET BY MOUTH TWICE DAILY WITH MEALS 180 tablet 1    aspirin 81 MG EC tablet Take 81 mg by mouth daily      Multiple Vitamins-Minerals (CENTRUM SILVER ADULT 50+) TABS Take 1 tablet by mouth daily      Ferrous Sulfate (IRON PO) Take by mouth daily      Flaxseed, Linseed, (FLAX SEED OIL PO) Take 1 tablet by mouth daily Indications: 1000mg soft gel capsule        No current facility-administered medications for this visit. Allergies: Bee venom and Nsaids  Past Medical History:   Diagnosis Date    AICD (automatic cardioverter/defibrillator) present 10/4/2017    Cardiac defibrillator in place     Chronic systolic congestive heart failure ([address] 6/1/2018    Coronary artery disease involving native coronary artery of native heart without angina pectoris 10/4/2017    Hypertension     Mixed hyperlipidemia 10/4/2017    Myocardial infarction ([address]     Nonischemic cardiomyopathy ([address] 10/4/2017    Type 2 diabetes mellitus with complication, with long-term current use of insulin ([address] 10/4/2017     Past Surgical History:   Procedure Laterality Date    CARDIAC CATHETERIZATION  6/9/14  JDT    diffuse CAD. EF 5%    CARDIAC DEFIBRILLATOR PLACEMENT  2014    Subcutaneous AICD, [place]    NECK SURGERY      SHOULDER SURGERY       No family history on file. Social History     Tobacco Use    Smoking status: Former     Packs/day: 0.00     Types: Cigarettes    Smokeless tobacco: Never   Substance Use Topics    Alcohol use: Yes          Review of Systems:    Review of Systems   Constitutional:  Positive for activity change and fatigue. Negative for chills, diaphoresis and fever.    HENT:  Negative for congestion and sore throat. Respiratory:  Negative for cough, chest tightness, shortness of breath and wheezing. Cardiovascular:  Negative for chest pain, palpitations and leg swelling. Neurological:  Positive for light-headedness. Negative for dizziness, syncope, weakness and headaches. Psychiatric/Behavioral:  Negative for confusion. The patient is not nervous/anxious. Objective:    BP (!) 92/52   Pulse 74   Ht 5' 7\" (1.702 m)   Wt 160 lb 9.6 oz (72.8 kg)   SpO2 94%   BMI 25.15 kg/m²     GENERAL - well developed and well nourished, conversant  HEENT -  PERRLA, Hearing appears normal, gentleman lids are normal.  External inspection of ears and nose appear normal.  NECK - no thyromegaly, no JVD, trachea is in the midline  CARDIOVASCULAR - PMI is in the mid line clavicular position, Normal S1 and S2 with no systolic murmur. No S3 or S4    PULMONARY - no respiratory distress. No wheezes or rales. Lungs are clear to ausculation, normal respiratory effort. ABDOMEN  - soft, non tender, no rebound  MUSCULOSKELETAL  - range of motion of the upper and lower extermites appears normal and equal and is without pain   EXTREMITIES - no significant edema   NEUROLOGIC - gait and station are normal  SKIN - turgor is normal, can warm and dry. PSYCHIATRIC - normal mood and affect, alert and orientated x 3,      ASSESSMENT:    ALL THE CARDIOLOGY PROBLEMS ARE LISTED ABOVE; HOWEVER, THE FOLLOWING SPECIFIC CARDIAC PROBLEMS / CONDITIONS WERE ADDRESSED AND TREATED DURING THE OFFICE VISIT TODAY:                                                                                            MEDICAL DECISION MAKING             Cardiac Specific Problem / Diagnosis  Discussion and Data Reviewed Diagnostic Procedures Ordered Management Options Selected           1. Ischemic cardiomyopathy  Shows no change   Review and summation of old records:    Blood pressure in the office today 92/52 oxygen sat 94%.   Will stop Alphonso allow for 48-hour washout. Will start lisinopril back at 5 mg daily. We will recheck in 2 weeks. A new digital blood pressure cuff was given to patient and wife to monitor blood pressure and pulse rate. Patient to continue on the Coreg 25 mg twice daily. As well as the Lasix and Aldactone. [facility] on September 12, 2022 battery status good lead impedance good. Thresholds good. No treated events. No Continue current medications:     Yes           2. CAD  Stable   No chest pain on Coreg, aspirin, Plavix and Crestor. No Continue current medications:    Yes           3. Hypertension Worsening Blood pressure 92/52. Patient on Coreg 25 mg twice a day. Entresto twice a day. We will stop Entresto and allow for 48-hour washout. Will restart lisinopril 5 mg daily. Patient to keep blood pressure log. No Continue current medications:       Yes           4. Dyslipidemia Stable Patient is on Crestor 20 mg daily. No Continue current medications:       Yes     No orders of the defined types were placed in this encounter. Orders Placed This Encounter   Medications    lisinopril (PRINIVIL;ZESTRIL) 5 MG tablet     Sig: Take 1 tablet by mouth daily     Dispense:  90 tablet     Refill:  3         Discussed with patient and spouse. Return in about 2 weeks (around 10/10/2022) for Telephone visit . I greatly appreciate the opportunity to meet Isadora Valderrama and your confidence in allowing me to participate in his cardiovascular care. SMILEY Chawla NP  9/26/2022 2:47 PM CDT                    This dictation was generated by voice recognition computer software. Although all attempts are made to edit dictation for accuracy, there may be errors in the transcription that are not intended.

## 2022-10-06 ENCOUNTER — TELEPHONE (OUTPATIENT)
Dept: CARDIOLOGY CLINIC | Age: 72
End: 2022-10-06

## 2022-10-18 ENCOUNTER — TELEPHONE (OUTPATIENT)
Dept: CARDIOLOGY CLINIC | Age: 72
End: 2022-10-18

## 2022-10-18 RX ORDER — CARVEDILOL 25 MG/1
TABLET ORAL
Qty: 180 TABLET | Refills: 2 | Status: SHIPPED | OUTPATIENT
Start: 2022-10-18 | End: 2022-10-24 | Stop reason: SDUPTHER

## 2022-10-19 ENCOUNTER — SCHEDULED TELEPHONE ENCOUNTER (OUTPATIENT)
Dept: CARDIOLOGY CLINIC | Age: 72
End: 2022-10-19
Payer: MEDICARE

## 2022-10-19 DIAGNOSIS — I10 ESSENTIAL HYPERTENSION: ICD-10-CM

## 2022-10-19 DIAGNOSIS — I25.5 ISCHEMIC CARDIOMYOPATHY: Primary | ICD-10-CM

## 2022-10-19 DIAGNOSIS — I25.10 CORONARY ARTERY DISEASE INVOLVING NATIVE CORONARY ARTERY OF NATIVE HEART WITHOUT ANGINA PECTORIS: ICD-10-CM

## 2022-10-19 PROCEDURE — 99442 PR PHYS/QHP TELEPHONE EVALUATION 11-20 MIN: CPT | Performed by: NURSE PRACTITIONER

## 2022-10-19 PROCEDURE — 1123F ACP DISCUSS/DSCN MKR DOCD: CPT | Performed by: NURSE PRACTITIONER

## 2022-10-19 NOTE — PROGRESS NOTES
Raymundo Parada is a 81 y.o. male evaluated via telephone on 10/19/2022. Consent:  He and/or health care decision maker is aware that that he may receive a bill for this telephone service, depending on his insurance coverage, and has provided verbal consent to proceed: Yes      Documentation:  I communicated with the patient and/or health care decision maker about blood pressure. Details of this discussion including any medical advice provided: Was physically located for this phone visit in the cardiology office in [place], [place]. Patient physically located at his residence. With a history of coronary artery disease, ischemic cardiomyopathy, AICD in situ, hypertension and hyperlipidemia. Patient presented to clinic 9/26/2022 for 6-month follow-up. Wife stated that patient had the end of June a carotid endarterectomy in [place]. She stated that he had some congestive heart failure and was started on new medication. They stopped his lisinopril and started him on Entresto. Since that time he has felt very tired and fatigued. Home blood pressures running in the low 90s. He has no energy. He said he felt better on the lisinopril. He denied any chest pain, pressure or tightness. There was no shortness of breath, orthopnea or PND. He denied any lower extremity edema. Blood pressure was 92/52. We did stop the Entresto to allow washout before restarting the lisinopril 5 mg daily. He was to continue to take his Coreg 25 mg twice a day as well as his Lasix and Aldactone. Patient states the lightheadedness dizziness and low blood pressures have resolved going back on the lisinopril. Blood pressure is within normal limits. 857-648 systolic. 67'C diastolic. Review of Systems  Constitutional: Negative for fever, chills, diaphoresis, activity change, appetite change, fatigue and unexpected weight change. Eyes: Negative for photophobia, pain, redness and visual disturbance.    Respiratory: Negative for apnea, cough, chest tightness, shortness of breath, wheezing and stridor. Cardiovascular: Negative for chest pain, palpitations and leg swelling. Gastrointestinal: Negative for abdominal distention. Genitourinary: Negative for dysuria, urgency and frequency. Musculoskeletal: Negative for myalgias, arthralgias and gait problem. Skin: Negative for color change, pallor, rash and wound. Neurological: Negative for dizziness, tremors, speech difficulty, weakness and numbness. Hematological: Does not bruise/bleed easily. Psychiatric/Behavioral: Negative.     Ramos Moore is a 81 y.o. male with the following history as recorded in [facility]:  Patient Active Problem List    Diagnosis Date Noted    Essential hypertension 01/18/2021    Chronic systolic congestive heart failure ([facility] [address] 06/01/2018    Coronary artery disease involving native coronary artery of native heart without angina pectoris 10/04/2017    Nonischemic cardiomyopathy ([facility] [address] 10/04/2017    Mixed hyperlipidemia 10/04/2017    Type 2 diabetes mellitus with complication, with long-term current use of insulin [facility] 10/04/2017    AICD (automatic cardioverter/defibrillator) present 10/04/2017     Current Outpatient Medications   Medication Sig Dispense Refill    carvedilol (COREG) 25 MG tablet TAKE 1 TABLET BY MOUTH TWICE DAILY WITH MEALS 180 tablet 2    clopidogrel (PLAVIX) 75 MG tablet Take 75 mg by mouth daily      furosemide (LASIX) 40 MG tablet Take 40 mg by mouth daily      rosuvastatin (CRESTOR) 20 MG tablet Take 20 mg by mouth daily      spironolactone (ALDACTONE) 25 MG tablet Take 25 mg by mouth daily      lisinopril (PRINIVIL;ZESTRIL) 5 MG tablet Take 1 tablet by mouth daily 90 tablet 3    aspirin 81 MG EC tablet Take 81 mg by mouth daily      Multiple Vitamins-Minerals (CENTRUM SILVER ADULT 50+) TABS Take 1 tablet by mouth daily      Ferrous Sulfate (IRON PO) Take by mouth daily      Flaxseed, Linseed, (FLAX SEED OIL PO) Take 1 tablet by mouth daily Indications: 1000mg soft gel capsule        No current facility-administered medications for this visit. Allergies: Bee venom and Nsaids  Past Medical History:   Diagnosis Date    AICD (automatic cardioverter/defibrillator) present 10/4/2017    Cardiac defibrillator in place     Chronic systolic congestive heart failure ([address] 6/1/2018    Coronary artery disease involving native coronary artery of native heart without angina pectoris 10/4/2017    Hypertension     Mixed hyperlipidemia 10/4/2017    Myocardial infarction ([address]     Nonischemic cardiomyopathy ([address] 10/4/2017    Type 2 diabetes mellitus with complication, with long-term current use of insulin ([address] 10/4/2017     Past Surgical History:   Procedure Laterality Date    CARDIAC CATHETERIZATION  6/9/14  JDT    diffuse CAD. EF 5%    CARDIAC DEFIBRILLATOR PLACEMENT  2014    Subcutaneous AICD, [place]    NECK SURGERY      SHOULDER SURGERY       No family history on file. Social History     Tobacco Use    Smoking status: Former     Packs/day: 0.00     Types: Cigarettes    Smokeless tobacco: Never   Substance Use Topics    Alcohol use: Yes        Assessment/ Plan:  Hypotension/dizziness/fatigue -resolved with stopping the Entresto and restarting the lisinopril. Congestive heart failure/ischemic cardiomyopathy -well-controlled on Coreg, Lasix, Aldactone and lisinopril. Continue present medical management. CAD no chest pain on Coreg, aspirin, Plavix and Crestor. Disposition: Return in about 6 months (around 4/19/2023) for Routine . I affirm this is a Patient Initiated Episode with an Established Patient who has not had a related appointment within my department in the past 7 days or scheduled within the next 24 hours.     Patient identification was verified at the start of the visit: Yes    Total Time: minutes: 11-20 minutes    Note: not billable if this call serves to triage the patient into an appointment for the relevant concern      Cecilia Belle, APRN - NP

## 2022-10-24 RX ORDER — LISINOPRIL 5 MG/1
5 TABLET ORAL DAILY
Qty: 90 TABLET | Refills: 3 | Status: SHIPPED | OUTPATIENT
Start: 2022-10-24

## 2022-10-24 RX ORDER — FUROSEMIDE 40 MG/1
40 TABLET ORAL DAILY
Qty: 60 TABLET | Refills: 3 | Status: SHIPPED | OUTPATIENT
Start: 2022-10-24

## 2022-10-24 RX ORDER — CLOPIDOGREL BISULFATE 75 MG/1
75 TABLET ORAL DAILY
Qty: 90 TABLET | Refills: 3 | Status: SHIPPED | OUTPATIENT
Start: 2022-10-24

## 2022-10-24 RX ORDER — ASPIRIN 81 MG/1
81 TABLET ORAL DAILY
Qty: 90 TABLET | Refills: 3 | Status: SHIPPED | OUTPATIENT
Start: 2022-10-24

## 2022-10-24 RX ORDER — CARVEDILOL 25 MG/1
TABLET ORAL
Qty: 180 TABLET | Refills: 3 | Status: SHIPPED | OUTPATIENT
Start: 2022-10-24

## 2022-10-24 RX ORDER — ROSUVASTATIN CALCIUM 20 MG/1
20 TABLET, COATED ORAL DAILY
Qty: 90 TABLET | Refills: 3 | Status: SHIPPED | OUTPATIENT
Start: 2022-10-24

## 2022-10-24 RX ORDER — SPIRONOLACTONE 25 MG/1
25 TABLET ORAL DAILY
Qty: 90 TABLET | Refills: 3 | Status: SHIPPED | OUTPATIENT
Start: 2022-10-24

## 2022-12-13 DIAGNOSIS — Z95.810 AICD (AUTOMATIC CARDIOVERTER/DEFIBRILLATOR) PRESENT: Primary | ICD-10-CM

## 2022-12-13 DIAGNOSIS — I50.22 CHRONIC SYSTOLIC HEART FAILURE (HCC): ICD-10-CM

## 2022-12-13 DIAGNOSIS — I25.5 ISCHEMIC CARDIOMYOPATHY: ICD-10-CM

## 2023-03-27 PROCEDURE — 93296 REM INTERROG EVL PM/IDS: CPT | Performed by: NURSE PRACTITIONER

## 2023-03-27 PROCEDURE — 93295 DEV INTERROG REMOTE 1/2/MLT: CPT | Performed by: NURSE PRACTITIONER

## 2023-03-28 DIAGNOSIS — Z95.810 AICD (AUTOMATIC CARDIOVERTER/DEFIBRILLATOR) PRESENT: Primary | ICD-10-CM

## 2023-03-28 DIAGNOSIS — I25.5 ISCHEMIC CARDIOMYOPATHY: ICD-10-CM

## 2023-03-28 DIAGNOSIS — I50.22 CHRONIC SYSTOLIC HEART FAILURE (HCC): ICD-10-CM

## 2023-04-03 ENCOUNTER — OFFICE VISIT (OUTPATIENT)
Dept: CARDIOLOGY CLINIC | Age: 73
End: 2023-04-03

## 2023-04-03 VITALS
HEART RATE: 83 BPM | DIASTOLIC BLOOD PRESSURE: 74 MMHG | SYSTOLIC BLOOD PRESSURE: 124 MMHG | BODY MASS INDEX: 26.21 KG/M2 | OXYGEN SATURATION: 97 % | HEIGHT: 67 IN | WEIGHT: 167 LBS

## 2023-04-03 DIAGNOSIS — I10 ESSENTIAL HYPERTENSION: ICD-10-CM

## 2023-04-03 DIAGNOSIS — E78.5 DYSLIPIDEMIA: ICD-10-CM

## 2023-04-03 DIAGNOSIS — I50.22 CHRONIC SYSTOLIC HEART FAILURE (HCC): ICD-10-CM

## 2023-04-03 DIAGNOSIS — I25.10 CORONARY ARTERY DISEASE INVOLVING NATIVE CORONARY ARTERY OF NATIVE HEART WITHOUT ANGINA PECTORIS: Primary | ICD-10-CM

## 2023-04-03 PROCEDURE — 99214 OFFICE O/P EST MOD 30 MIN: CPT | Performed by: NURSE PRACTITIONER

## 2023-04-03 PROCEDURE — 3078F DIAST BP <80 MM HG: CPT | Performed by: NURSE PRACTITIONER

## 2023-04-03 PROCEDURE — 3074F SYST BP LT 130 MM HG: CPT | Performed by: NURSE PRACTITIONER

## 2023-04-03 PROCEDURE — 1123F ACP DISCUSS/DSCN MKR DOCD: CPT | Performed by: NURSE PRACTITIONER

## 2023-04-03 RX ORDER — ACETAMINOPHEN 160 MG
TABLET,DISINTEGRATING ORAL
COMMUNITY

## 2023-04-03 RX ORDER — LISINOPRIL 5 MG/1
5 TABLET ORAL 2 TIMES DAILY
Qty: 180 TABLET | Refills: 3 | Status: SHIPPED | OUTPATIENT
Start: 2023-04-03

## 2023-04-03 ASSESSMENT — ENCOUNTER SYMPTOMS
WHEEZING: 0
CHEST TIGHTNESS: 0
COUGH: 0
SHORTNESS OF BREATH: 0
SORE THROAT: 0

## 2023-04-03 NOTE — PROGRESS NOTES
confusion. The patient is not nervous/anxious. Objective:    /74   Pulse 83   Ht 5' 7\" (1.702 m)   Wt 167 lb (75.8 kg)   SpO2 97%   BMI 26.16 kg/m²     GENERAL - well developed and well nourished, conversant  HEENT -  PERRLA, Hearing appears normal, gentleman lids are normal.  External inspection of ears and nose appear normal.  NECK - no thyromegaly, no JVD, trachea is in the midline  CARDIOVASCULAR - PMI is in the mid line clavicular position, Normal S1 and S2 with no systolic murmur. No S3 or S4    PULMONARY - no respiratory distress. No wheezes or rales. Lungs are clear to ausculation, normal respiratory effort. ABDOMEN  - soft, non tender, no rebound  MUSCULOSKELETAL  - range of motion of the upper and lower extermites appears normal and equal and is without pain   EXTREMITIES - no significant edema   NEUROLOGIC - gait and station are normal  SKIN - turgor is normal, can warm and dry. PSYCHIATRIC - normal mood and affect, alert and orientated x 3,      ASSESSMENT:    ALL THE CARDIOLOGY PROBLEMS ARE LISTED ABOVE; HOWEVER, THE FOLLOWING SPECIFIC CARDIAC PROBLEMS / CONDITIONS WERE ADDRESSED AND TREATED DURING THE OFFICE VISIT TODAY:                                                                                            MEDICAL DECISION MAKING             Cardiac Specific Problem / Diagnosis  Discussion and Data Reviewed Diagnostic Procedures Ordered Management Options Selected           1. CAD  Stable   Review and summation of old records:    No chest pain. Patient is on aspirin, Coreg, Crestor. No Continue current medications:     Yes           2. Hypertension  Stable   Blood pressure in the office today 124/74. O2 sat 97%. Patient is on lisinopril 5 mg twice daily. Coreg 25 mg twice a day. No Continue current medications:    Yes           3. Chronic systolic congestive heart failure Stable No overt signs of failure on Coreg, Lasix, lisinopril and Aldactone.  No Continue current

## 2023-07-05 DIAGNOSIS — I25.5 ISCHEMIC CARDIOMYOPATHY: ICD-10-CM

## 2023-07-05 DIAGNOSIS — I50.22 CHRONIC SYSTOLIC HEART FAILURE (HCC): ICD-10-CM

## 2023-07-05 DIAGNOSIS — Z95.810 AICD (AUTOMATIC CARDIOVERTER/DEFIBRILLATOR) PRESENT: Primary | ICD-10-CM

## 2023-08-15 RX ORDER — FUROSEMIDE 40 MG/1
TABLET ORAL
Qty: 60 TABLET | Refills: 3 | Status: SHIPPED | OUTPATIENT
Start: 2023-08-15

## 2023-08-15 RX ORDER — SPIRONOLACTONE 25 MG/1
TABLET ORAL
Qty: 90 TABLET | Refills: 3 | Status: SHIPPED | OUTPATIENT
Start: 2023-08-15

## 2023-08-24 ENCOUNTER — TELEPHONE (OUTPATIENT)
Dept: CARDIOLOGY CLINIC | Age: 73
End: 2023-08-24

## 2023-08-24 NOTE — TELEPHONE ENCOUNTER
Left voice message for patient tor return call. We need to schedule his next ICD check to be done in the office to have a software update as recommended by [employer].

## 2023-08-29 NOTE — TELEPHONE ENCOUNTER
Patient and his wife notified of need for software update in office. Appointment scheduled for 9/11/23.

## 2023-09-11 ENCOUNTER — OFFICE VISIT (OUTPATIENT)
Dept: CARDIOLOGY CLINIC | Age: 73
End: 2023-09-11
Payer: MEDICARE

## 2023-09-11 VITALS
BODY MASS INDEX: 26.68 KG/M2 | OXYGEN SATURATION: 98 % | HEART RATE: 82 BPM | SYSTOLIC BLOOD PRESSURE: 122 MMHG | HEIGHT: 67 IN | DIASTOLIC BLOOD PRESSURE: 64 MMHG | WEIGHT: 170 LBS

## 2023-09-11 DIAGNOSIS — I25.5 ISCHEMIC CARDIOMYOPATHY: ICD-10-CM

## 2023-09-11 DIAGNOSIS — E78.5 DYSLIPIDEMIA: ICD-10-CM

## 2023-09-11 DIAGNOSIS — I25.10 CORONARY ARTERY DISEASE INVOLVING NATIVE CORONARY ARTERY OF NATIVE HEART WITHOUT ANGINA PECTORIS: Primary | ICD-10-CM

## 2023-09-11 DIAGNOSIS — I10 ESSENTIAL HYPERTENSION: ICD-10-CM

## 2023-09-11 PROCEDURE — 1123F ACP DISCUSS/DSCN MKR DOCD: CPT | Performed by: NURSE PRACTITIONER

## 2023-09-11 PROCEDURE — 99214 OFFICE O/P EST MOD 30 MIN: CPT | Performed by: NURSE PRACTITIONER

## 2023-09-11 PROCEDURE — 3074F SYST BP LT 130 MM HG: CPT | Performed by: NURSE PRACTITIONER

## 2023-09-11 PROCEDURE — 93289 INTERROG DEVICE EVAL HEART: CPT | Performed by: NURSE PRACTITIONER

## 2023-09-11 PROCEDURE — 3078F DIAST BP <80 MM HG: CPT | Performed by: NURSE PRACTITIONER

## 2023-09-11 ASSESSMENT — ENCOUNTER SYMPTOMS
SHORTNESS OF BREATH: 0
WHEEZING: 0
SORE THROAT: 0
CHEST TIGHTNESS: 0
COUGH: 0

## 2023-09-11 NOTE — PROGRESS NOTES
[facility] Cardiology   Established Patient Office Visit  [address] [place] [address]  [phone]        OFFICE VISIT:  2023    [address] : 1950    Reason For Visit:  Jonah Santamaria is a 81 y.o. male who is here for Follow-up    1. Coronary artery disease involving native coronary artery of native heart without angina pectoris    2. Essential hypertension    3. Dyslipidemia    4. Ischemic cardiomyopathy      With a history of coronary artery disease, ischemic cardiomyopathy, AICD in situ, hypertension and hyperlipidemia. He is a patient of Dr. Tony Cisneros. Patient presents to clinic today accompanied by wife. He denies any chest pain, pressure or tightness. There is no shortness of breath, orthopnea or PND. Patient denies any lightheadedness, dizziness or syncope. Patient presents to clinic for interrogation of ICD and for software update.       Bibi Cox is a 81 y.o. male with the [facility]:    Patient Active Problem List    Diagnosis Date Noted    Essential hypertension 2021    Chronic systolic congestive heart failure ([address]) 2018    Coronary artery disease involving native coronary artery of native heart without angina pectoris 10/04/2017    Nonischemic cardiomyopathy ([address]) 10/04/2017    Mixed hyperlipidemia 10/04/2017    Type 2 diabetes mellitus with complication, with long-term current use of insulin ([address]) 10/04/2017    AICD (automatic cardioverter/defibrillator) present 10/04/2017     Current Outpatient Medications   Medication Sig Dispense Refill    furosemide (LASIX) 40 MG tablet Take 1 tablet by mouth once daily 60 tablet 3    spironolactone (ALDACTONE) 25 MG tablet Take 1 tablet by mouth once daily 90 tablet 3    Cholecalciferol (VITAMIN D3) 50 MCG ( UT) CAPS Take by mouth      lisinopril (PRINIVIL;ZESTRIL) 5 MG tablet Take 1 tablet by mouth in the morning and at bedtime (Patient taking

## 2023-11-29 RX ORDER — ROSUVASTATIN CALCIUM 20 MG/1
20 TABLET, COATED ORAL DAILY
Qty: 90 TABLET | Refills: 0 | OUTPATIENT
Start: 2023-11-29

## 2023-12-07 RX ORDER — LISINOPRIL 5 MG/1
5 TABLET ORAL DAILY
Qty: 90 TABLET | Refills: 3 | Status: SHIPPED | OUTPATIENT
Start: 2023-12-07

## 2023-12-07 RX ORDER — CARVEDILOL 25 MG/1
TABLET ORAL
Qty: 180 TABLET | Refills: 2 | Status: SHIPPED | OUTPATIENT
Start: 2023-12-07

## 2024-03-14 RX ORDER — ROSUVASTATIN CALCIUM 20 MG/1
20 TABLET, COATED ORAL DAILY
Qty: 90 TABLET | Refills: 0 | OUTPATIENT
Start: 2024-03-14

## 2024-03-18 PROCEDURE — 93296 REM INTERROG EVL PM/IDS: CPT | Performed by: NURSE PRACTITIONER

## 2024-03-18 PROCEDURE — 93295 DEV INTERROG REMOTE 1/2/MLT: CPT | Performed by: NURSE PRACTITIONER

## 2024-03-19 DIAGNOSIS — I50.22 CHRONIC SYSTOLIC HEART FAILURE (HCC): ICD-10-CM

## 2024-03-19 DIAGNOSIS — Z95.810 AICD (AUTOMATIC CARDIOVERTER/DEFIBRILLATOR) PRESENT: Primary | ICD-10-CM

## 2024-03-19 DIAGNOSIS — I25.5 ISCHEMIC CARDIOMYOPATHY: ICD-10-CM

## 2024-06-20 PROCEDURE — 93296 REM INTERROG EVL PM/IDS: CPT | Performed by: NURSE PRACTITIONER

## 2024-06-20 PROCEDURE — 93295 DEV INTERROG REMOTE 1/2/MLT: CPT | Performed by: NURSE PRACTITIONER

## 2024-06-24 DIAGNOSIS — I50.22 CHRONIC SYSTOLIC HEART FAILURE (HCC): ICD-10-CM

## 2024-06-24 DIAGNOSIS — I25.5 ISCHEMIC CARDIOMYOPATHY: ICD-10-CM

## 2024-06-24 DIAGNOSIS — Z95.810 ICD (IMPLANTABLE CARDIOVERTER-DEFIBRILLATOR) IN PLACE: Primary | ICD-10-CM

## 2024-09-03 RX ORDER — CARVEDILOL 25 MG/1
TABLET ORAL
Qty: 180 TABLET | Refills: 0 | Status: SHIPPED | OUTPATIENT
Start: 2024-09-03

## 2024-09-23 PROCEDURE — 93295 DEV INTERROG REMOTE 1/2/MLT: CPT | Performed by: NURSE PRACTITIONER

## 2024-09-23 PROCEDURE — 93296 REM INTERROG EVL PM/IDS: CPT | Performed by: NURSE PRACTITIONER

## 2024-09-24 DIAGNOSIS — Z95.810 AICD (AUTOMATIC CARDIOVERTER/DEFIBRILLATOR) PRESENT: Primary | ICD-10-CM

## 2024-09-24 DIAGNOSIS — I50.22 CHRONIC SYSTOLIC HEART FAILURE (HCC): ICD-10-CM

## 2024-09-24 DIAGNOSIS — I25.5 ISCHEMIC CARDIOMYOPATHY: ICD-10-CM

## 2024-11-05 DIAGNOSIS — I25.5 ISCHEMIC CARDIOMYOPATHY: ICD-10-CM

## 2024-11-05 DIAGNOSIS — Z95.810 ICD (IMPLANTABLE CARDIOVERTER-DEFIBRILLATOR) IN PLACE: Primary | ICD-10-CM

## 2024-11-05 DIAGNOSIS — I50.22 CHRONIC SYSTOLIC HEART FAILURE (HCC): ICD-10-CM

## 2025-02-06 DIAGNOSIS — Z95.810 ICD (IMPLANTABLE CARDIOVERTER-DEFIBRILLATOR) IN PLACE: Primary | ICD-10-CM

## 2025-02-06 DIAGNOSIS — I25.5 ISCHEMIC CARDIOMYOPATHY: ICD-10-CM

## 2025-02-06 DIAGNOSIS — I50.22 CHRONIC SYSTOLIC HEART FAILURE (HCC): ICD-10-CM

## 2025-04-22 NOTE — PATIENT INSTRUCTIONS
Appropriate treatment Orders Placed This Encounter   Procedures    EKG 12 lead     Order Specific Question:   Reason for Exam?     Answer:   Coronary artery disease     Return in about 3 months (around 1/12/2021). Call with any questionsor concerns  Follow up with Kyra Horvath MD for non cardiac problems  Report any new problems  Cardiovascular Fitness-Exercise as tolerated. Strive for 15 minutes of exercise most days of the week. Cardiac / HealthyDiet  Continue current medications as directed  Continue plan of treatment  It is always recommended that you bring your medicationsbottles with you to each visit - this is for your safety!

## 2025-05-13 ENCOUNTER — RESULTS FOLLOW-UP (OUTPATIENT)
Dept: CARDIOLOGY CLINIC | Age: 75
End: 2025-05-13

## 2025-05-13 DIAGNOSIS — Z95.810 ICD (IMPLANTABLE CARDIOVERTER-DEFIBRILLATOR) IN PLACE: Primary | ICD-10-CM

## 2025-05-13 DIAGNOSIS — I25.5 ISCHEMIC CARDIOMYOPATHY: ICD-10-CM

## 2025-08-14 DIAGNOSIS — Z95.810 AICD (AUTOMATIC CARDIOVERTER/DEFIBRILLATOR) PRESENT: Primary | ICD-10-CM

## 2025-08-14 DIAGNOSIS — I25.5 ISCHEMIC CARDIOMYOPATHY: ICD-10-CM

## (undated) DEVICE — PAD, DEFIB, ADULT, RADIOTRANS, PHILIPS: Brand: MEDLINE

## (undated) DEVICE — SOL NS 500ML

## (undated) DEVICE — PATIENT RETURN ELECTRODE, SINGLE-USE, CONTACT QUALITY MONITORING, ADULT, WITH 9FT CORD, FOR PATIENTS WEIGING OVER 33LBS. (15KG): Brand: MEGADYNE

## (undated) DEVICE — 3M™ IOBAN™ 2 ANTIMICROBIAL INCISE DRAPE 6650EZ: Brand: IOBAN™ 2

## (undated) DEVICE — ADHS SKIN PREMIERPRO EXOFIN TOPICAL HI/VISC .5ML

## (undated) DEVICE — TBG PENCL TELESCP MEGADYNE SMOKE EVAC 10FT

## (undated) DEVICE — APPL CHLORAPREP HI/LITE 26ML ORNG

## (undated) DEVICE — SOL IRR NACL 0.9PCT BT 1000ML

## (undated) DEVICE — PK PM 30